# Patient Record
Sex: MALE | Race: OTHER | Employment: FULL TIME | URBAN - METROPOLITAN AREA
[De-identification: names, ages, dates, MRNs, and addresses within clinical notes are randomized per-mention and may not be internally consistent; named-entity substitution may affect disease eponyms.]

---

## 2017-07-17 ENCOUNTER — OFFICE VISIT (OUTPATIENT)
Dept: FAMILY MEDICINE CLINIC | Age: 57
End: 2017-07-17

## 2017-07-17 ENCOUNTER — HOSPITAL ENCOUNTER (OUTPATIENT)
Dept: LAB | Age: 57
Discharge: HOME | End: 2017-07-17
Payer: COMMERCIAL

## 2017-07-17 VITALS
SYSTOLIC BLOOD PRESSURE: 116 MMHG | HEART RATE: 71 BPM | TEMPERATURE: 98.1 F | OXYGEN SATURATION: 97 % | BODY MASS INDEX: 20.56 KG/M2 | WEIGHT: 131 LBS | RESPIRATION RATE: 16 BRPM | HEIGHT: 67 IN | DIASTOLIC BLOOD PRESSURE: 78 MMHG

## 2017-07-17 DIAGNOSIS — Z13.1 SCREENING FOR DIABETES MELLITUS (DM): ICD-10-CM

## 2017-07-17 DIAGNOSIS — Z13.220 SCREENING FOR LIPID DISORDERS: ICD-10-CM

## 2017-07-17 DIAGNOSIS — K14.9 TONGUE IRRITATION: Primary | ICD-10-CM

## 2017-07-17 DIAGNOSIS — K14.9 TONGUE IRRITATION: ICD-10-CM

## 2017-07-17 PROCEDURE — 87252 VIRUS INOCULATION TISSUE: CPT | Performed by: FAMILY MEDICINE

## 2017-07-17 RX ORDER — NYSTATIN 100000 [USP'U]/ML
1 SUSPENSION ORAL 4 TIMES DAILY
Qty: 200 ML | Refills: 0 | Status: SHIPPED | OUTPATIENT
Start: 2017-07-17 | End: 2017-07-27

## 2017-07-17 NOTE — PROGRESS NOTES
Chief Complaint   Patient presents with    Other     tongue cracking and burning- for approx 1.5 weeks (recent trip to [facility])     Has been using son's chlorhexadine with minor relief

## 2017-07-17 NOTE — PROGRESS NOTES
Danielle Mendez, 49 y.o.,  male    SUBJECTIVE  Tongue irritation x 1.5 weeks      Reports vacationing in [place], and noted cracking/dry lips then. Applied freq lip balm with some improvement, then developed tongue irritation, worse with flavorful food. Pt is a nonsmoker, says using chlohexidine mouth wash with some relief. Says food taste bland. ROS:  See HPI, all others negative        There is no problem list on file for this patient. Current Outpatient Prescriptions   Medication Sig Dispense Refill    nystatin (MYCOSTATIN) 100,000 unit/mL suspension Take 5 mL by mouth four (4) times daily for 10 days. swish and spit 200 mL 0       Allergies   Allergen Reactions    Amoxicillin Hives and Itching    Pcn [Penicillins] Hives and Itching       Past Medical History:   Diagnosis Date    H/O colonoscopy 18    rpt 10 years       Social History     Social History    Marital status:      Spouse name: N/A    Number of children: N/A    Years of education: N/A     Occupational History    Not on file.      Social History Main Topics    Smoking status: Never Smoker    Smokeless tobacco: Never Used    Alcohol use No    Drug use: No    Sexual activity: Yes     Partners: Female     Other Topics Concern    Not on file     Social History Narrative       Family History   Problem Relation Age of Onset    Hypertension Mother     Other Mother       from colitis complications     High Cholesterol Father     Diabetes Father     Heart Attack Paternal Grandmother     Heart Failure Paternal Grandmother          OBJECTIVE    Physical Exam:     Visit Vitals    /78 (BP 1 Location: Left arm, BP Patient Position: Sitting)    Pulse 71    Temp 98.1 °F (36.7 °C) (Oral)    Resp 16    Ht 5' 6.5\" (1.689 m)    Wt 131 lb (59.4 kg)    SpO2 97%    BMI 20.83 kg/m2       General: alert, well-appearing, in no apparent distress or pain  Eyes: Lids with no discharge, no matting, conjunctivae clear and non injected, full EOMs, PERLLA  Ears: pinna non-tender, external auditory canal patent, TM intact  Mouth/throat:tonsils non enlarged, pharynx non erythematous and no lesion, nasal mucosa normal  Thin whitish layer on anterior tongue, no ulcerations or lesions noted. Neck: supple, no adenopathy palpated  CVS: normal rate, regular rhythm, distinct S1 and S2  Lungs:clear to ausculation bilaterally, no crackles, wheezing or rhonchi noted  Psych:  mood and affect normal        ASSESSMENT/PLAN  Arlene Gracia was seen today for other. Diagnoses and all orders for this visit:    Tongue irritation  Mild  Trial:  -     nystatin (MYCOSTATIN) 100,000 unit/mL suspension; Take 5 mL by mouth four (4) times daily for 10 days. swish and spit  -     CULTURE, VIRAL; Future    Screening for lipid disorders  -     LIPID PANEL; Future    Screening for diabetes mellitus (DM)  -     METABOLIC PANEL, COMPREHENSIVE; Future      Follow-up Disposition:  Return in about 2 weeks (around 7/31/2017), or plan for CPE then. Patient understands plan of care. Patient has provided input and agrees with goals.

## 2017-07-17 NOTE — MR AVS SNAPSHOT
Visit Information Date & Time Provider Department Dept. Phone Encounter #  
 7/17/2017  3:00 PM Kit Wright, [address] [id number] Follow-up Instructions Return in about 2 weeks (around 7/31/2017), or plan for CPE then. Upcoming Health Maintenance Date Due DTaP/Tdap/Td series (2 - Td) 2/2/2016 INFLUENZA AGE 9 TO ADULT 8/1/2017 COLONOSCOPY 1/5/2019 Allergies as of 7/17/2017  Review Complete On: 7/17/2017 By: Kit Wright MD  
  
 Severity Noted Reaction Type Reactions Amoxicillin  12/30/2010    Hives, Itching Pcn [Penicillins]  09/29/2015    Hives, Itching Current Immunizations  Never Reviewed Name Date TDAP Vaccine 2/2/2006 Not reviewed this visit You Were Diagnosed With   
  
 Codes Comments Tongue irritation    -  Primary ICD-10-CM: K14.9 ICD-9-CM: 529.9 Screening for lipid disorders     ICD-10-CM: Z13.220 ICD-9-CM: V77.91 Screening for diabetes mellitus (DM)     ICD-10-CM: Z13.1 ICD-9-CM: V77.1 Vitals BP Pulse Temp Resp Height(growth percentile) Weight(growth percentile) 116/78 (BP 1 Location: Left arm, BP Patient Position: Sitting) 71 98.1 °F (36.7 °C) (Oral) 16 5' 6.5\" (1.689 m) 131 lb (59.4 kg) SpO2 BMI Smoking Status 97% 20.83 kg/m2 Never Smoker BMI and BSA Data Body Mass Index Body Surface Area  
 20.83 kg/m 2 1.67 m 2 Preferred Pharmacy Pharmacy Name Phone [facility] [place] [address] ,[facility] [address], [address] [facility], [address] [facility] [phone] Your Updated Medication List  
  
   
This list is accurate as of: 7/17/17  3:13 PM.  Always use your most recent med list.  
  
  
  
  
 nystatin 100,000 unit/mL suspension Commonly known as:  MYCOSTATIN Take 5 mL by mouth four (4) times daily for 10 days. swish and spit Prescriptions Sent to Pharmacy  Refills  
 nystatin (MYCOSTATIN) 100,000 unit/mL suspension 0  
 Sig: Take 5 mL by mouth four (4) times daily for 10 days. swish and spit Class: Normal  
 Pharmacy: [facility] [address] [place] [address], [address] [place] [address] #: [phone] Route: Oral  
  
Follow-up Instructions Return in about 2 weeks (around 7/31/2017), or plan for CPE then. To-Do List   
 07/17/2017 Lab:  LIPID PANEL   
  
 07/17/2017 Lab:  METABOLIC PANEL, COMPREHENSIVE Patient Instructions Please bring vaccine record on next visit, fasting labs prior to next visit as well. Introducing [facility] & HEALTH SERVICES! [facility] introduces [employer] patient portal. Now you can access parts of your medical record, email your doctor's office, and request medication refills online. 1. In your internet browser, go to [email or web address] 2. Click on the First Time User? Click Here link in the Sign In box. You will see the New Member Sign Up page. 3. Enter your [employer] Access Code exactly as it appears below. You will not need to use this code after youve completed the sign-up process. If you do not sign up before the expiration date, you must request a new code. · [employer] Access Code: [email or web address] Expires: 10/15/2017  3:13 PM 
 
4. Enter the last four digits of your Social Security Number (xxxx) and Date of Birth (mm/dd/yyyy) as indicated and click Submit. You will be taken to the next sign-up page. 5. Create a [employer] ID. This will be your [employer] login ID and cannot be changed, so think of one that is secure and easy to remember. 6. Create a [employer] password. You can change your password at any time. 7. Enter your Password Reset Question and Answer. This can be used at a later time if you forget your password. 8. Enter your e-mail address. You will receive e-mail notification when new information is available in [address] 9. Click Sign Up. You can now view and download portions of your medical record. 10. Click the Download Summary menu link to download a portable copy of your medical information. If you have questions, please visit the Frequently Asked Questions section of the Azingo website. Remember, [employer]zing[employer] is NOT to be used for urgent needs. For medical emergencies, dial 911. Now available from your iPhone and Android! Please provide this summary of care documentation to your next provider. Your primary care clinician is listed as Shmuel Rosario. If you have any questions after today's visit, please call [phone].

## 2017-07-21 ENCOUNTER — HOSPITAL ENCOUNTER (OUTPATIENT)
Dept: LAB | Age: 57
Discharge: HOME | End: 2017-07-21
Payer: COMMERCIAL

## 2017-07-21 DIAGNOSIS — Z13.220 SCREENING FOR LIPID DISORDERS: ICD-10-CM

## 2017-07-21 DIAGNOSIS — Z13.1 SCREENING FOR DIABETES MELLITUS (DM): ICD-10-CM

## 2017-07-21 LAB
ALBUMIN SERPL BCP-MCNC: 3.8 G/DL (ref 3.4–5)
ALBUMIN/GLOB SERPL: 1.5 {RATIO} (ref 0.8–1.7)
ALP SERPL-CCNC: 46 U/L (ref 45–117)
ALT SERPL-CCNC: 31 U/L (ref 16–61)
ANION GAP BLD CALC-SCNC: 6 MMOL/L (ref 3–18)
AST SERPL W P-5'-P-CCNC: 15 U/L (ref 15–37)
BILIRUB SERPL-MCNC: 0.5 MG/DL (ref 0.2–1)
BUN SERPL-MCNC: 14 MG/DL (ref 7–18)
BUN/CREAT SERPL: 15 (ref 12–20)
CALCIUM SERPL-MCNC: 8.3 MG/DL (ref 8.5–10.1)
CHLORIDE SERPL-SCNC: 105 MMOL/L (ref 100–108)
CHOLEST SERPL-MCNC: 186 MG/DL
CO2 SERPL-SCNC: 30 MMOL/L (ref 21–32)
CREAT SERPL-MCNC: 0.92 MG/DL (ref 0.6–1.3)
GLOBULIN SER CALC-MCNC: 2.6 G/DL (ref 2–4)
GLUCOSE SERPL-MCNC: 81 MG/DL (ref 74–99)
HDLC SERPL-MCNC: 47 MG/DL (ref 40–60)
HDLC SERPL: 4 {RATIO} (ref 0–5)
LDLC SERPL CALC-MCNC: 120.8 MG/DL (ref 0–100)
LIPID PROFILE,FLP: ABNORMAL
POTASSIUM SERPL-SCNC: 4.7 MMOL/L (ref 3.5–5.5)
PROT SERPL-MCNC: 6.4 G/DL (ref 6.4–8.2)
SODIUM SERPL-SCNC: 141 MMOL/L (ref 136–145)
TRIGL SERPL-MCNC: 91 MG/DL (ref ?–150)
VLDLC SERPL CALC-MCNC: 18.2 MG/DL

## 2017-07-21 PROCEDURE — 80061 LIPID PANEL: CPT | Performed by: FAMILY MEDICINE

## 2017-07-21 PROCEDURE — 36415 COLL VENOUS BLD VENIPUNCTURE: CPT | Performed by: FAMILY MEDICINE

## 2017-07-21 PROCEDURE — 80053 COMPREHEN METABOLIC PANEL: CPT | Performed by: FAMILY MEDICINE

## 2017-07-25 LAB
SPECIMEN SOURCE: NORMAL
VIRUS SPEC CULT: NORMAL

## 2017-07-26 NOTE — PROGRESS NOTES
Patient identified with 2 identifiers (name and ).   Patient aware of negative tongue culture reminded of appt 2017

## 2017-08-03 ENCOUNTER — OFFICE VISIT (OUTPATIENT)
Dept: FAMILY MEDICINE CLINIC | Age: 57
End: 2017-08-03

## 2017-08-03 VITALS
SYSTOLIC BLOOD PRESSURE: 114 MMHG | RESPIRATION RATE: 16 BRPM | HEART RATE: 74 BPM | WEIGHT: 130 LBS | DIASTOLIC BLOOD PRESSURE: 64 MMHG | HEIGHT: 67 IN | OXYGEN SATURATION: 98 % | TEMPERATURE: 97.9 F | BODY MASS INDEX: 20.4 KG/M2

## 2017-08-03 DIAGNOSIS — Z00.00 ROUTINE MEDICAL EXAM: ICD-10-CM

## 2017-08-03 DIAGNOSIS — Z00.00 WELL ADULT EXAM: Primary | ICD-10-CM

## 2017-08-03 NOTE — PROGRESS NOTES
Chief Complaint   Patient presents with    Well Male     1. Have you been to the ER, urgent care clinic since your last visit? Hospitalized since your last visit? No    2. Have you seen or consulted any other health care providers outside of the [address] [place] [address] since your last visit? Include any pap smears or colon screening.  No

## 2017-08-03 NOTE — MR AVS SNAPSHOT
Visit Information Date & Time Provider Department Dept. Phone Encounter #  
 8/3/2017  2:00 PM Esmeralda Ahumada, [address] [id number] Follow-up Instructions Return in about 1 year (around 8/3/2018), or if symptoms worsen or fail to improve. Upcoming Health Maintenance Date Due INFLUENZA AGE 9 TO ADULT 8/1/2017 COLONOSCOPY 1/5/2019 DTaP/Tdap/Td series (3 - Td) 8/3/2027 Allergies as of 8/3/2017  Review Complete On: 8/3/2017 By: Esmeralda Ahumada, MD  
  
 Severity Noted Reaction Type Reactions Amoxicillin  12/30/2010    Hives, Itching Pcn [Penicillins]  09/29/2015    Hives, Itching Current Immunizations  Never Reviewed Name Date TDAP Vaccine 2/2/2006 Not reviewed this visit You Were Diagnosed With   
  
 Codes Comments Well adult exam    -  Primary ICD-10-CM: Z00.00 ICD-9-CM: V70.0 Vitals BP Pulse Temp Resp Height(growth percentile) Weight(growth percentile) 114/64 (BP 1 Location: Left arm, BP Patient Position: Sitting) 74 97.9 °F (36.6 °C) (Oral) 16 5' 6.5\" (1.689 m) 130 lb (59 kg) SpO2 BMI Smoking Status 98% 20.67 kg/m2 Never Smoker Vitals History BMI and BSA Data Body Mass Index Body Surface Area  
 20.67 kg/m 2 1.66 m 2 Preferred Pharmacy Pharmacy Name Phone [facility], [address] [facility] [phone] Your Updated Medication List  
  
Notice  As of 8/3/2017  2:32 PM  
 You have not been prescribed any medications. Follow-up Instructions Return in about 1 year (around 8/3/2018), or if symptoms worsen or fail to improve. Patient Instructions Heart-Healthy Diet: Care Instructions Your Care Instructions A heart-healthy diet has lots of vegetables, fruits, nuts, beans, and whole grains, and is low in salt.  It limits foods that are high in saturated fat, such as meats, cheeses, and fried foods. It may be hard to change your diet, but even small changes can lower your risk of heart attack and heart disease. Follow-up care is a key part of your treatment and safety. Be sure to make and go to all appointments, and call your doctor if you are having problems. It's also a good idea to know your test results and keep a list of the medicines you take. How can you care for yourself at home? Watch your portions · Learn what a serving is. A \"serving\" and a \"portion\" are not always the same thing. Make sure that you are not eating larger portions than are recommended. For example, a serving of pasta is ½ cup. A serving size of meat is 2 to 3 ounces. A 3-ounce serving is about the size of a deck of cards. Measure serving sizes until you are good at [place]" them. Keep in mind that restaurants often serve portions that are 2 or 3 times the size of one serving. · To keep your energy level up and keep you from feeling hungry, eat often but in smaller portions. · Eat only the number of calories you need to stay at a healthy weight. If you need to lose weight, eat fewer calories than your body burns (through exercise and other physical activity). Eat more fruits and vegetables · Eat a variety of fruit and vegetables every day. Dark green, deep orange, red, or yellow fruits and vegetables are especially good for you. Examples include spinach, carrots, peaches, and berries. · Keep carrots, celery, and other veggies handy for snacks. Buy fruit that is in season and store it where you can see it so that you will be tempted to eat it. · Cook dishes that have a lot of veggies in them, such as stir-fries and soups. Limit saturated and trans fat · Read food labels, and try to avoid saturated and trans fats. They increase your risk of heart disease. Trans fat is found in many processed foods such as cookies and crackers. · Use olive or canola oil when you cook. Try cholesterol-lowering spreads, such as Benecol or Take Control. · Bake, broil, grill, or steam foods instead of frying them. · Choose lean meats instead of high-fat meats such as hot dogs and sausages. Cut off all visible fat when you prepare meat. · Eat fish, skinless poultry, and meat alternatives such as soy products instead of high-fat meats. Soy products, such as tofu, may be especially good for your heart. · Choose low-fat or fat-free milk and dairy products. Eat fish · Eat at least two servings of fish a week. Certain fish, such as salmon and tuna, contain omega-3 fatty acids, which may help reduce your risk of heart attack. Eat foods high in fiber · Eat a variety of grain products every day. Include whole-grain foods that have lots of fiber and nutrients. Examples of whole-grain foods include oats, whole wheat bread, and brown rice. · Buy whole-grain breads and cereals, instead of white bread or pastries. Limit salt and sodium · Limit how much salt and sodium you eat to help lower your blood pressure. · Taste food before you salt it. Add only a little salt when you think you need it. With time, your taste buds will adjust to less salt. · Eat fewer snack items, fast foods, and other high-salt, processed foods. Check food labels for the amount of sodium in packaged foods. · Choose low-sodium versions of canned goods (such as soups, vegetables, and beans). Limit sugar · Limit drinks and foods with added sugar. These include candy, desserts, and soda pop. Limit alcohol · Limit alcohol to no more than 2 drinks a day for men and 1 drink a day for women. Too much alcohol can cause health problems. When should you call for help? Watch closely for changes in your health, and be sure to contact your doctor if: 
· You would like help planning heart-healthy meals. Where can you learn more? Go to http://jimmy.info/. Enter V137 in the search box to learn more about \"Heart-Healthy Diet: Care Instructions. \" Current as of: April 3, 2017 Content Version: 11.3 © 5151-0195 [employer], [employer]. Care instructions adapted under license by [employer] (which disclaims liability or warranty for this information). If you have questions about a medical condition or this instruction, always ask your healthcare professional. [address] any warranty or liability for your use of this information. Introducing \[facility]\"" & HEALTH SERVICES! Mervat Rae introduces [employer] patient portal. Now you can access parts of your medical record, email your doctor's office, and request medication refills online. 1. In your internet browser, go to [email or web address] 2. Click on the First Time User? Click Here link in the Sign In box. You will see the New Member Sign Up page. 3. Enter your [employer] Access Code exactly as it appears below. You will not need to use this code after youve completed the sign-up process. If you do not sign up before the expiration date, you must request a new code. · [employer] Access Code: [email or web address] Expires: 10/15/2017  3:13 PM 
 
4. Enter the last four digits of your Social Security Number (xxxx) and Date of Birth (mm/dd/yyyy) as indicated and click Submit. You will be taken to the next sign-up page. 5. Create a [employer] ID. This will be your [employer] login ID and cannot be changed, so think of one that is secure and easy to remember. 6. Create a [employer] password. You can change your password at any time. 7. Enter your Password Reset Question and Answer. This can be used at a later time if you forget your password. 8. Enter your e-mail address. You will receive e-mail notification when new information is available in [address] 9. Click Sign Up. You can now view and download portions of your medical record. 10. Click the Download Summary menu link to download a portable copy of your medical information. If you have questions, please visit the Frequently Asked Questions section of the Boond website. Remember, [employer] is NOT to be used for urgent needs. For medical emergencies, dial 911. Now available from your iPhone and Android! Please provide this summary of care documentation to your next provider. Your primary care clinician is listed as Margarette Young. If you have any questions after today's visit, please call [phone].

## 2017-08-03 NOTE — PATIENT INSTRUCTIONS

## 2017-08-03 NOTE — PROGRESS NOTES
Subjective:   Ap Pozo is a 49 y.o. male presenting for his annual checkup. ROS:  Feeling well. No dyspnea or chest pain on exertion. No abdominal pain, change in bowel habits, black or bloody stools. No urinary tract or prostatic symptoms. No neurological complaints. Specific concerns today: slight improvement of tongue discomfort with trial nystatin, thinks the chlorhexidine mouthwash more effective. Reviewed labs/colonoscopy report    Allergies   Allergen Reactions    Amoxicillin Hives and Itching    Pcn [Penicillins] Hives and Itching     Past Medical History:   Diagnosis Date    H/O colonoscopy 2016    rpt 5 years,      Past Surgical History:   Procedure Laterality Date    APPENDECTOMY      HX COLONOSCOPY  2016    Dr. Rizwan Lew 1 polyp    HX RHINOPLASTY      Dr. Amira Domínguez History   Problem Relation Age of Onset    Hypertension Mother     Other Mother       from colitis complications     High Cholesterol Father     Diabetes Father     Heart Attack Paternal Grandmother     Heart Failure Paternal Grandmother      Social History   Substance Use Topics    Smoking status: Never Smoker    Smokeless tobacco: Never Used    Alcohol use No          Objective:     Visit Vitals    /64 (BP 1 Location: Left arm, BP Patient Position: Sitting)    Pulse 74    Temp 97.9 °F (36.6 °C) (Oral)    Resp 16    Ht 5' 6.5\" (1.689 m)    Wt 130 lb (59 kg)    SpO2 98%    BMI 20.67 kg/m2     The patient appears well, alert, oriented x 3, in no distress. ENT normal.  Neck supple. No adenopathy or thyromegaly. BRYCE. Lungs are clear, good air entry, no wheezes, rhonchi or rales. S1 and S2 normal, no murmurs, regular rate and rhythm. Abdomen is soft without tenderness, guarding, mass or organomegaly. Extremities show no edema, normal peripheral pulses. Neurological is normal without focal findings.   Results for orders placed or performed during the hospital encounter of 07/21/17   LIPID PANEL   Result Value Ref Range    LIPID PROFILE          Cholesterol, total 186 <200 MG/DL    Triglyceride 91 <150 MG/DL    HDL Cholesterol 47 40 - 60 MG/DL    LDL, calculated 120.8 (H) 0 - 100 MG/DL    VLDL, calculated 18.2 MG/DL    CHOL/HDL Ratio 4.0 0 - 5.0     METABOLIC PANEL, COMPREHENSIVE   Result Value Ref Range    Sodium 141 136 - 145 mmol/L    Potassium 4.7 3.5 - 5.5 mmol/L    Chloride 105 100 - 108 mmol/L    CO2 30 21 - 32 mmol/L    Anion gap 6 3.0 - 18 mmol/L    Glucose 81 74 - 99 mg/dL    BUN 14 7.0 - 18 MG/DL    Creatinine 0.92 0.6 - 1.3 MG/DL    BUN/Creatinine ratio 15 12 - 20      GFR est AA >60 >60 ml/min/1.73m2    GFR est non-AA >60 >60 ml/min/1.73m2    Calcium 8.3 (L) 8.5 - 10.1 MG/DL    Bilirubin, total 0.5 0.2 - 1.0 MG/DL    ALT (SGPT) 31 16 - 61 U/L    AST (SGOT) 15 15 - 37 U/L    Alk. phosphatase 46 45 - 117 U/L    Protein, total 6.4 6.4 - 8.2 g/dL    Albumin 3.8 3.4 - 5.0 g/dL    Globulin 2.6 2.0 - 4.0 g/dL    A-G Ratio 1.5 0.8 - 1.7         Assessment/Plan:   Diagnoses and all orders for this visit:    1. Well adult exam  healthy adult male  continue present plan, call if any problems. reviewed diet, exercise and weight control. Colonoscopy update 2021  Declines tdap, says thinks he is UTD, will check records  Advised to call us if tongue discomfort persist by next month and will refer to ENT    Ff-up in 1 year or sooner prn. Patient/guardian understands plan of care. Patient has provided input and agrees with goals. Future labs to be discussed on next visit.

## 2017-08-25 ENCOUNTER — CLINICAL SUPPORT (OUTPATIENT)
Dept: FAMILY MEDICINE CLINIC | Age: 57
End: 2017-08-25

## 2017-08-25 DIAGNOSIS — Z23 NEED FOR TDAP VACCINATION: Primary | ICD-10-CM

## 2017-08-25 NOTE — PROGRESS NOTES
Chief Complaint   Patient presents with    Immunization/Injection     Tdap     Patient presents for the following vaccines: Tdap. Patient consent obtained by patient. Patient tolerated procedure well at right deltoid.  VIS given to patient    Lot# [id number]   Exp: 10/14/2019  MFG:[id number]  NDC: 90028-569-35

## 2019-10-30 ENCOUNTER — HOSPITAL ENCOUNTER (OUTPATIENT)
Dept: LAB | Age: 59
Discharge: HOME | End: 2019-10-30
Payer: COMMERCIAL

## 2019-10-30 ENCOUNTER — OFFICE VISIT (OUTPATIENT)
Dept: FAMILY MEDICINE CLINIC | Age: 59
End: 2019-10-30

## 2019-10-30 VITALS
SYSTOLIC BLOOD PRESSURE: 116 MMHG | BODY MASS INDEX: 20.88 KG/M2 | WEIGHT: 133 LBS | OXYGEN SATURATION: 98 % | HEART RATE: 61 BPM | RESPIRATION RATE: 16 BRPM | DIASTOLIC BLOOD PRESSURE: 70 MMHG | TEMPERATURE: 97.5 F | HEIGHT: 67 IN

## 2019-10-30 DIAGNOSIS — R13.10 DYSPHAGIA, UNSPECIFIED TYPE: Primary | ICD-10-CM

## 2019-10-30 DIAGNOSIS — K21.9 GASTROESOPHAGEAL REFLUX DISEASE, ESOPHAGITIS PRESENCE NOT SPECIFIED: ICD-10-CM

## 2019-10-30 DIAGNOSIS — R14.0 BLOATING: ICD-10-CM

## 2019-10-30 DIAGNOSIS — Z12.5 PROSTATE CANCER SCREENING: ICD-10-CM

## 2019-10-30 DIAGNOSIS — R10.13 EPIGASTRIC PAIN: ICD-10-CM

## 2019-10-30 DIAGNOSIS — Z13.220 LIPID SCREENING: ICD-10-CM

## 2019-10-30 LAB
ALBUMIN SERPL-MCNC: 3.9 G/DL (ref 3.4–5)
ALBUMIN/GLOB SERPL: 1.4 {RATIO} (ref 0.8–1.7)
ALP SERPL-CCNC: 45 U/L (ref 45–117)
ALT SERPL-CCNC: 24 U/L (ref 16–61)
ANION GAP SERPL CALC-SCNC: 2 MMOL/L (ref 3–18)
AST SERPL-CCNC: 13 U/L (ref 10–38)
BASOPHILS # BLD: 0 K/UL (ref 0–0.1)
BASOPHILS NFR BLD: 1 % (ref 0–2)
BILIRUB SERPL-MCNC: 0.5 MG/DL (ref 0.2–1)
BUN SERPL-MCNC: 15 MG/DL (ref 7–18)
BUN/CREAT SERPL: 16 (ref 12–20)
CALCIUM SERPL-MCNC: 9.1 MG/DL (ref 8.5–10.1)
CHLORIDE SERPL-SCNC: 110 MMOL/L (ref 100–111)
CO2 SERPL-SCNC: 32 MMOL/L (ref 21–32)
CREAT SERPL-MCNC: 0.96 MG/DL (ref 0.6–1.3)
DIFFERENTIAL METHOD BLD: NORMAL
EOSINOPHIL # BLD: 0.1 K/UL (ref 0–0.4)
EOSINOPHIL NFR BLD: 2 % (ref 0–5)
ERYTHROCYTE [DISTWIDTH] IN BLOOD BY AUTOMATED COUNT: 13.3 % (ref 11.6–14.5)
GLOBULIN SER CALC-MCNC: 2.8 G/DL (ref 2–4)
GLUCOSE SERPL-MCNC: 87 MG/DL (ref 74–99)
HCT VFR BLD AUTO: 45.5 % (ref 36–48)
HGB BLD-MCNC: 15 G/DL (ref 13–16)
LIPASE SERPL-CCNC: 90 U/L (ref 73–393)
LYMPHOCYTES # BLD: 1.2 K/UL (ref 0.9–3.6)
LYMPHOCYTES NFR BLD: 25 % (ref 21–52)
MCH RBC QN AUTO: 28.9 PG (ref 24–34)
MCHC RBC AUTO-ENTMCNC: 33 G/DL (ref 31–37)
MCV RBC AUTO: 87.7 FL (ref 74–97)
MONOCYTES # BLD: 0.4 K/UL (ref 0.05–1.2)
MONOCYTES NFR BLD: 9 % (ref 3–10)
NEUTS SEG # BLD: 3 K/UL (ref 1.8–8)
NEUTS SEG NFR BLD: 63 % (ref 40–73)
PLATELET # BLD AUTO: 197 K/UL (ref 135–420)
PMV BLD AUTO: 10.5 FL (ref 9.2–11.8)
POTASSIUM SERPL-SCNC: 4.6 MMOL/L (ref 3.5–5.5)
PROT SERPL-MCNC: 6.7 G/DL (ref 6.4–8.2)
RBC # BLD AUTO: 5.19 M/UL (ref 4.7–5.5)
SODIUM SERPL-SCNC: 144 MMOL/L (ref 136–145)
WBC # BLD AUTO: 4.7 K/UL (ref 4.6–13.2)

## 2019-10-30 PROCEDURE — 80053 COMPREHEN METABOLIC PANEL: CPT

## 2019-10-30 PROCEDURE — 83690 ASSAY OF LIPASE: CPT

## 2019-10-30 PROCEDURE — 85025 COMPLETE CBC W/AUTO DIFF WBC: CPT

## 2019-10-30 PROCEDURE — 36415 COLL VENOUS BLD VENIPUNCTURE: CPT

## 2019-10-30 NOTE — PROGRESS NOTES
Estrellita Adams, 64 y.o.,  male    SUBJECTIVE  Dysphagia since June     Pt reports sensation of food/drinks getting stuck intermittently. At times regurgitation of both solid and liquids, has pattern with dairy, says after switching to almond milk and physically pressing on his epigastric area, he finds relief. He has heartburn and bloating symptoms as well. Denies weight loss, melena. Reviewed colonoscopy letter [facility] 1/27/16 benign polyp repeat 10 years. He denies any changes in BMs. He has not tried anything for this. He is otherwise healthy he says, stays active, he runs. ROS:  See HPI, all others negative        There is no problem list on file for this patient.           Allergies   Allergen Reactions    Amoxicillin Hives and Itching    Pcn [Penicillins] Hives and Itching       Past Medical History:   Diagnosis Date    H/O colonoscopy 01/05/2016    rpt 5 years, 2021       Social History     Socioeconomic History    Marital status:      Spouse name: Not on file    Number of children: Not on file    Years of education: Not on file    Highest education level: Not on file   Occupational History    Not on file   Social Needs    Financial resource strain: Not on file    Food insecurity:     Worry: Not on file     Inability: Not on file    Transportation needs:     Medical: Not on file     Non-medical: Not on file   Tobacco Use    Smoking status: Never Smoker    Smokeless tobacco: Never Used   Substance and Sexual Activity    Alcohol use: No    Drug use: No    Sexual activity: Yes     Partners: Female   Lifestyle    Physical activity:     Days per week: Not on file     Minutes per session: Not on file    Stress: Not on file   Relationships    Social connections:     Talks on phone: Not on file     Gets together: Not on file     Attends Yarsanism service: Not on file     Active member of club or organization: Not on file     Attends meetings of clubs or organizations: Not on file Relationship status: Not on file    Intimate partner violence:     Fear of current or ex partner: Not on file     Emotionally abused: Not on file     Physically abused: Not on file     Forced sexual activity: Not on file   Other Topics Concern    Not on file   Social History Narrative    Not on file       Family History   Problem Relation Age of Onset    Hypertension Mother     Other Mother          from colitis complications     High Cholesterol Father     Diabetes Father     Heart Attack Paternal Grandmother     Heart Failure Paternal Grandmother          OBJECTIVE    Physical Exam:     Visit Vitals  /70 (BP 1 Location: Left arm, BP Patient Position: Sitting)   Pulse 61   Temp 97.5 °F (36.4 °C) (Oral)   Resp 16   Ht 5' 6.5\" (1.689 m)   Wt 133 lb (60.3 kg)   SpO2 98%   BMI 21.15 kg/m²       General: alert, well-appearing,  in no apparent distress or pain  Head: atraumatic. Non-tender maxillary and frontal sinuses  Eyes: Lids with no discharge, no matting, conjunctivae clear and non injected, full EOMs, PERLLA  Ears: pinna non-tender, external auditory canal patent, TM intact  Mouth/throat:tonsils non enlarged, pharynx non erythematous and no lesion, nasal mucosa normal  Neck: supple, no adenopathy palpated  CVS: normal rate, regular rhythm, distinct S1 and S2  Lungs:clear to ausculation bilaterally, no crackles, wheezing or rhonchi noted  Abdomen: normoactive bowel sounds, soft, non-tender, negative murphys  Extremities: no edema, no cyanosis, MSK grossly normal  Skin: warm, no lesions, rashes noted  Psych:  mood and affect normal        ASSESSMENT/PLAN  Diagnoses and all orders for this visit:    1. Dysphagia, unspecified type  Subacute, no red flag signs  Start PPI   Offered barium swallow, prefers to have GI eval/EGD done  Refer to GI  -     CBC WITH AUTOMATED DIFF; Future  -     METABOLIC PANEL, COMPREHENSIVE; Future  -     LIPASE; Future  -     REFERRAL TO GASTROENTEROLOGY    2. Gastroesophageal reflux disease, esophagitis presence not specified  Start protonix 40 mg 30 mins prior to meals  -     REFERRAL TO GASTROENTEROLOGY    3. Epigastric pain  -     REFERRAL TO GASTROENTEROLOGY    4. Prostate cancer screening  Shared decision  -     PSA SCREENING (SCREENING); Future    5. Lipid screening  -     LIPID PANEL; Future    6. Bloating    Follow-up and Dispositions    · Return in about 6 weeks (around 12/11/2019), or if symptoms worsen or fail to improve, for after your GI evaluation , plan for Complete physical on next visit. Patient understands plan of care. Patient has provided input and agrees with goals.

## 2019-10-30 NOTE — PATIENT INSTRUCTIONS

## 2019-10-30 NOTE — PROGRESS NOTES
1. Have you been to the ER, urgent care clinic since your last visit? Hospitalized since your last visit? No    2. Have you seen or consulted any other health care providers outside of the [address] [place] [address] since your last visit? Include any pap smears or colon screening.  No

## 2019-11-04 NOTE — PROGRESS NOTES
Patient identified with 2 identifiers (name and ). Patients spouse aware of normal labs.    Inocencio Urias (on HIPPA)

## 2020-07-14 ENCOUNTER — OFFICE VISIT (OUTPATIENT)
Dept: ORTHOPEDIC SURGERY | Age: 60
End: 2020-07-14

## 2020-07-14 VITALS
HEIGHT: 67 IN | DIASTOLIC BLOOD PRESSURE: 88 MMHG | WEIGHT: 141.6 LBS | HEART RATE: 85 BPM | TEMPERATURE: 97.7 F | BODY MASS INDEX: 22.22 KG/M2 | RESPIRATION RATE: 16 BRPM | SYSTOLIC BLOOD PRESSURE: 115 MMHG | OXYGEN SATURATION: 91 %

## 2020-07-14 DIAGNOSIS — M25.512 ACUTE PAIN OF LEFT SHOULDER: ICD-10-CM

## 2020-07-14 DIAGNOSIS — M75.02 ADHESIVE CAPSULITIS OF LEFT SHOULDER: Primary | ICD-10-CM

## 2020-07-14 RX ORDER — MELOXICAM 15 MG/1
15 TABLET ORAL
Qty: 30 TAB | Refills: 1 | Status: SHIPPED | OUTPATIENT
Start: 2020-07-14 | End: 2020-10-09

## 2020-07-14 NOTE — PROGRESS NOTES
Verbal order with read back given by Dr. Katie Morales to sign order for PT entered by Catie Moran ([address]).

## 2020-07-14 NOTE — PROGRESS NOTES
José Luis Cabral  1960   Chief Complaint   Patient presents with    Shoulder Pain     left shoulder pain        HISTORY OF PRESENT ILLNESS  José Luis Cabral is a 64 y.o. male who presents today for evaluation of left shoulder. He rates his pain 3/10 today. Pain has been present since February/March. Pt denies any specific injury. 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 Pain with stretching, reaching, certain movements of the arm. Pain at night with sleeping on the left side. Patient describes the pain as numbing and stabbing that is Intermittent in nature. Symptoms are worse with bending, stretching, straightening, Activity and is better with  nothing. Associated symptoms include stiffness. Since problem started, it: is unchanged. Pain does not wake patient up at night. Has taken no meds for the problem. Has tried following treatments: Injections:NO; Brace:NO;  Therapy:NO; Cane/Crutch:NO       Allergies   Allergen Reactions    Amoxicillin Hives and Itching    Pcn [Penicillins] Hives and Itching        Past Medical History:   Diagnosis Date    H/O colonoscopy 01/05/2016    rpt 5 years, 2021      Social History     Socioeconomic History    Marital status:      Spouse name: Not on file    Number of children: Not on file    Years of education: Not on file    Highest education level: Not on file   Occupational History    Not on file   Social Needs    Financial resource strain: Not on file    Food insecurity     Worry: Not on file     Inability: Not on file   [employer] needs     Medical: Not on file     Non-medical: Not on file   Tobacco Use    Smoking status: Never Smoker    Smokeless tobacco: Never Used   Substance and Sexual Activity    Alcohol use: No    Drug use: No    Sexual activity: Yes     Partners: Female   Lifestyle    Physical activity     Days per week: Not on file     Minutes per session: Not on file    Stress: Not on file   Relationships    Social connections     Talks on phone: Not on file     Gets together: Not on file     Attends [employer] service: Not on file     Active member of club or organization: Not on file     Attends meetings of clubs or organizations: Not on file     Relationship status: Not on file    Intimate partner violence     Fear of current or ex partner: Not on file     Emotionally abused: Not on file     Physically abused: Not on file     Forced sexual activity: Not on file   Other Topics Concern    Not on file   Social History Narrative    Not on file      Past Surgical History:   Procedure Laterality Date    APPENDECTOMY      HX COLONOSCOPY  2016    Dr. Grisel Whittaker 1 polyp    HX RHINOPLASTY      Dr. Yaquelin Cuevas Age of Onset    Hypertension Mother     Other Mother          from colitis complications     High Cholesterol Father     Diabetes Father     Heart Attack Paternal Grandmother     Heart Failure Paternal Grandmother       Current Outpatient Medications   Medication Sig    omeprazole magnesium (PRILOSEC PO) Take  by mouth. No current facility-administered medications for this visit. REVIEW OF SYSTEM   Patient denies: Weight loss, Fever/Chills, HA, Visual changes, Fatigue, Chest pain, SOB, Abdominal pain, N/V/D/C, Blood in stool or urine, Edema. Pertinent positive as above in HPI. All others were negative    PHYSICAL EXAM:   Visit Vitals  /88 (BP 1 Location: Left arm, BP Patient Position: Sitting)   Pulse 85   Temp 97.7 °F (36.5 °C) (Oral)   Resp 16   Ht 5' 6.5\" (1.689 m)   Wt 141 lb 9.6 oz (64.2 kg)   SpO2 91%   BMI 22.51 kg/m²     The patient is a well-developed, well-nourished male   in no acute distress. The patient is alert and oriented times three. The patient is alert and oriented times three. Mood and affect are normal.  LYMPHATIC: lymph nodes are not enlarged and are within normal limits  SKIN: normal in color and non tender to palpation.  There are no bruises or abrasions noted. NEUROLOGICAL: Motor sensory exam is within normal limits. Reflexes are equal bilaterally. There is normal sensation to pinprick and light touch  MUSCULOSKELETAL:  Examination Left shoulder   Skin Intact   AC joint tenderness -   Biceps tenderness -   Forward flexion/Elevation    Active abduction    Glenohumeral abduction 80   External rotation ROM 45   Internal rotation ROM 30   Apprehension -   Marinas Relocation -   Jerk -   Load and Shift -   Obriens -   Speeds -   Impingement sign  +   Supraspinatus/Empty Can -, 5/5   External Rotation Strength -, 5/5   Lift Off/Belly Press -, 5/5   Neurovascular Intact       IMAGING: XR of left shoulder obtained in the office dated 7/14/2020 was reviewed and read by Dr. Ad Rosa: No acute abnormalities       IMPRESSION:      ICD-10-CM ICD-9-CM    1. Adhesive capsulitis of left shoulder  M75.02 726.0 REFERRAL TO PHYSICAL THERAPY      meloxicam (Mobic) 15 mg tablet   2. Acute pain of left shoulder  M25.512 719.41 AMB POC XRAY, SHOULDER; COMPLETE, 2+        PLAN:  1. Pt presents today with left shoulder pain due to adhesive capsulitis and I would like for him to begin PT. Was also given a prescription for Mobic today. Risk factors include: n/a  2. No ultrasound exam indicated today  3. No cortisone injection indicated today   4. Yes Physical/Occupational Therapy indicated today  5. No diagnostic test indicated today:   6. No durable medical equipment indicated today  7. No referral indicated today   8. Yes medications indicated today: MOBIC  9. No Narcotic indicated today       RTC 4 weeks      Scribed by Tate Avilez [address] [place] [address] [address]) as dictated by Wong oMrales MD    I, Dr. Wong Morales, confirm that all documentation is accurate.     Wong Morales M.D.   [facility] and Spine Specialist

## 2020-07-21 ENCOUNTER — HOSPITAL ENCOUNTER (OUTPATIENT)
Dept: PHYSICAL THERAPY | Age: 60
Discharge: HOME | End: 2020-07-21
Payer: COMMERCIAL

## 2020-07-21 PROCEDURE — 97110 THERAPEUTIC EXERCISES: CPT

## 2020-07-21 PROCEDURE — 97161 PT EVAL LOW COMPLEX 20 MIN: CPT

## 2020-07-21 NOTE — PROGRESS NOTES
[facility] [address], [address] [phone] fax    Plan of Care/ Statement of Necessity for Physical Therapy Services           Patient name: Mallory Pierre Start of Care: 2020   Referral source: Rashmi Shoemaker,* : 1960    Medical Diagnosis: Pain in left shoulder [M25.512]  Payor: VIV PATEL / Plan: [facility]eron[facility] Cano / Product Type: PPO /  Onset Date:2020    Treatment Diagnosis: left shoulder pain   Prior Hospitalization: see medical history Provider#: [id number]   Medications: Verified on Patient summary List    Comorbidities: none per patient   Prior Level of Function: functionally independent and active. Works as manager at  [employer] and following information is based on the information from the initial evaluation. Assessment/ key information: Patient is a 64 y. o.male presenting with Pain in left shoulder [M25.512]. Mr. Vernon Chao presents to initial PT evaluation with c/o left shoulder pain and stiffness worsening since 2020 after swatting at a bug. He displays limited passive ROM in a capsular pattern, as well as limited active ROM overhead and behind the back. (+) RTC involvement and impingement signs present. Symptoms consistent with impingement with beginnings of adhesive capsulitis. Patient hs requested to trial HEP from today due to busy work schedule. Discussed he will trial at home and should he have nay limitations over the next month we will see him back in the clinic for revision or progression of current HEP. Patient in agreement with this plan. . Patient will benefit from skilled PT services to address deficits and facilitate return to premorbid activity level and promote improved quality of life.        Evaluation Complexity History LOW Complexity : Zero comorbidities / personal factors that will impact the outcome / POC; Examination LOW Complexity : 1-2 Standardized tests and measures addressing body structure, function, activity limitation and / or participation in recreation  ;Presentation MEDIUM Complexity : Evolving with changing characteristics  ; Clinical Decision Making MEDIUM Complexity : FOTO score of 26-74  Overall Complexity Rating: LOW   Problem List: pain affecting function, decrease ROM, decrease strength, edema affecting function, impaired gait/ balance, decrease ADL/ functional abilitiies, decrease activity tolerance, decrease flexibility/ joint mobility and decrease transfer abilities   Treatment Plan may include any combination of the following: Therapeutic exercise, Therapeutic activities, Neuromuscular re-education, Physical agent/modality, Gait/balance training, Manual therapy, Aquatic therapy, Patient education, Self Care training, Functional mobility training, Home safety training and Stair training  Patient / Family readiness to learn indicated by: asking questions, trying to perform skills and interest  Persons(s) to be included in education: patient (P)  Barriers to Learning/Limitations: None  Patient Goal (s): make it go away.   Patient Self Reported Health Status: good  Rehabilitation Potential: good  Goals: To be accomplished in 2 treatments. 1. Therapist to establish HEP for strength and ROM to improve ease with ADLs. MET - issued at evaluation  2.. Patient will be independent with HEP to improve carryover of functional gains with ADLs between visits. showed compliance with HEP provided at time of exam.     Frequency / Duration: Patient to be seen 1 times per week for 2 treatments.     Patient/ Caregiver education and instruction: Diagnosis, prognosis, self care, activity modification and exercises   [x]  Plan of care has been reviewed with ANISH De La Torre, PT 7/21/2020 1:35 PM    ________________________________________________________________________    I certify that the above Therapy Services are being furnished while the patient is under my care. I agree with the treatment plan and certify that this therapy is necessary.     Physician's Signature:____________Date:_________TIME:________    ** Signature, Date and Time must be completed for valid certification **    Please sign and return to [facility] [address], [address] [phone] fax

## 2020-07-21 NOTE — PROGRESS NOTES
PT DAILY TREATMENT NOTE - [facility]     Patient Name: Marita Engel  Date:2020  : 1960  [x]  Patient  Verified  Payor: BLUE CROSS / Plan:  [facility] / Product Type: PPO /    In time:200  Out time:245  Total Treatment Time (min): 45  Total Timed Codes (min): 30  1:1 Treatment Time: 45   Visit #: 1 of 2    Treatment Area: Pain in left shoulder [M25.512]    SUBJECTIVE  Pain Level (0-10 scale): 2  Any medication changes, allergies to medications, adverse drug reactions, diagnosis change, or new procedure performed?: [x] No    [] Yes (see summary sheet for update)  Subjective functional status:   [x] See Eval form in paper chart      OBJECTIVE    15 min [x]Eval                  []Re-Eval       30 min Therapeutic Exercise:  [x] See flow sheet :HEP review and education   Rationale: increase ROM, increase strength, improve coordination, improve balance and increase proprioception to improve the patients ability to perform ADLs. With   [] TE   [] TA   [] neuro   [] other: Patient Education: [x] Review HEP    [] Progressed/Changed HEP based on:   [] positioning   [] body mechanics   [] transfers   [] heat/ice application    [] other:                  Pain Level (0-10 scale) post treatment: 2    ASSESSMENT:   [x]  See Evaluation         Goals:  Goals: To be accomplished in 2 treatments. 1. Therapist to establish HEP for strength and ROM to improve ease with ADLs. MET - issued at evaluation  2.. Patient will be independent with HEP to improve carryover of functional gains with ADLs between visits.     showed compliance with HEP provided at time of exam.    PLAN      [x]  Continue plan of care    []  Other:_      Renato Pugh, PT 2020  1:36 PM

## 2020-08-11 ENCOUNTER — OFFICE VISIT (OUTPATIENT)
Dept: ORTHOPEDIC SURGERY | Age: 60
End: 2020-08-11

## 2020-08-11 VITALS
OXYGEN SATURATION: 98 % | HEIGHT: 67 IN | BODY MASS INDEX: 22.13 KG/M2 | DIASTOLIC BLOOD PRESSURE: 80 MMHG | HEART RATE: 93 BPM | RESPIRATION RATE: 17 BRPM | SYSTOLIC BLOOD PRESSURE: 131 MMHG | TEMPERATURE: 98.6 F | WEIGHT: 141 LBS

## 2020-08-11 DIAGNOSIS — M75.02 ADHESIVE CAPSULITIS OF LEFT SHOULDER: Primary | ICD-10-CM

## 2020-08-11 NOTE — PROGRESS NOTES
Isaiah Theodore  1960   Chief Complaint   Patient presents with    Shoulder Pain     left        HISTORY OF PRESENT ILLNESS  Isaiah Theodore is a 64 y.o. male who presents today for reevaluation of left shoulder. Patient rates pain as 0/10 today. Has been going to PT with improvement. He has improved ROM, only notes occasional pain and tightness. He states he had 80% improvement after the first session of PT. Night pain has improved. Patient denies any fever, chills, chest pain, shortness of breath or calf pain. The remainder of the review of systems is negative. There are no new illness or injuries to report since last seen in the office. There are no changes to medications, allergies, family or social history. PHYSICAL EXAM:   Visit Vitals  /80 (BP 1 Location: Left arm)   Pulse 93   Temp 98.6 °F (37 °C) (Temporal)   Resp 17   Ht 5' 6.5\" (1.689 m)   Wt 141 lb (64 kg)   SpO2 98%   BMI 22.42 kg/m²     The patient is a well-developed, well-nourished male   in no acute distress. The patient is alert and oriented times three. The patient is alert and oriented times three. Mood and affect are normal.  LYMPHATIC: lymph nodes are not enlarged and are within normal limits  SKIN: normal in color and non tender to palpation. There are no bruises or abrasions noted. NEUROLOGICAL: Motor sensory exam is within normal limits. Reflexes are equal bilaterally.  There is normal sensation to pinprick and light touch  MUSCULOSKELETAL:  Examination Left shoulder   Skin Intact   AC joint tenderness -   Biceps tenderness -   Forward flexion/Elevation    Active abduction    Glenohumeral abduction 80   External rotation ROM 45   Internal rotation ROM 30   Apprehension -   Marinas Relocation -   Jerk -   Load and Shift -   Obriens -   Speeds -   Impingement sign  +   Supraspinatus/Empty Can -, 5/5   External Rotation Strength -, 5/5   Lift Off/Belly Press -, 5/5   Neurovascular Intact      IMAGING:  XR of left shoulder obtained in the office dated 7/14/2020 was reviewed and read by Dr. Cain Rain: No acute abnormalities       IMPRESSION:      ICD-10-CM ICD-9-CM    1. Adhesive capsulitis of left shoulder  M75.02 726.0         PLAN:   1. Pt presents today with left shoulder pain due to adhesive capsulitis that has improved with PT. He can continue with his stretching exercises and can return as needed. Risk factors include: n/a  2. No ultrasound exam indicated today  3. No cortisone injection indicated today   4. No Physical/Occupational Therapy indicated today  5. No diagnostic test indicated today:   6. No durable medical equipment indicated today  7. No referral indicated today   8. No medications indicated today:   9. No Narcotic indicated today       RTC prn      Scribed by Catie Valdez) as dictated by Heather Barrera MD    I, Dr. Heather Barrera, confirm that all documentation is accurate.     Heather Barrera M.D.   [address] [place] [address] and Spine Specialist

## 2020-09-21 NOTE — PROGRESS NOTES
[facility] [place] [address]  [address], [address] [phone] fax    Discharge Summary  Patient name: Meme Saavedra Start of Care: 2020   Referral source: Sofiya Stewart,* : 1960                Medical Diagnosis: Pain in left shoulder [M25.512]  Payor: BLUE CROSS / Plan: [email or web address] [facility] / Product Type: PPO /  Onset Date:2020                Treatment Diagnosis: left shoulder pain   Prior Hospitalization: see medical history Provider#: [id number]   Medications: Verified on Patient summary List    Comorbidities: none per patient   Prior Level of Function: functionally independent and active. 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 1. Therapist to establish HEP for strength and ROM to improve ease with ADLs. MET - issued at evaluation  2.. Patient will be independent with HEP to improve carryover of functional gains with ADLs between visits. showed compliance with HEP provided at time of exam.    MET    Assessment/Summary of care: Mr. Sergo Bustillo was seen for evaluation and given HEP due to busy schedule. Pt was instructed to return if he continued to have limitations; pt has not returned to clinic, therefore he is DC at this time.      RECOMMENDATIONS:  [x]Discontinue therapy: [x]Patient has reached or is progressing toward set goals      []Patient is non-compliant or has abdicated      []Due to lack of appreciable progress towards set [id number] [place], PT 2020 10:32 AM

## 2020-10-08 ENCOUNTER — TELEPHONE (OUTPATIENT)
Dept: FAMILY MEDICINE CLINIC | Age: 60
End: 2020-10-08

## 2020-10-09 ENCOUNTER — OFFICE VISIT (OUTPATIENT)
Dept: FAMILY MEDICINE CLINIC | Age: 60
End: 2020-10-09
Payer: COMMERCIAL

## 2020-10-09 VITALS
OXYGEN SATURATION: 97 % | HEIGHT: 67 IN | HEART RATE: 68 BPM | DIASTOLIC BLOOD PRESSURE: 70 MMHG | SYSTOLIC BLOOD PRESSURE: 114 MMHG | WEIGHT: 133.6 LBS | RESPIRATION RATE: 16 BRPM | TEMPERATURE: 98.1 F | BODY MASS INDEX: 20.97 KG/M2

## 2020-10-09 DIAGNOSIS — Z00.00 WELL ADULT EXAM: Primary | ICD-10-CM

## 2020-10-09 DIAGNOSIS — Z12.5 PROSTATE CANCER SCREENING: ICD-10-CM

## 2020-10-09 DIAGNOSIS — Z13.1 SCREENING FOR DIABETES MELLITUS (DM): ICD-10-CM

## 2020-10-09 DIAGNOSIS — R31.29 MICROSCOPIC HEMATURIA: ICD-10-CM

## 2020-10-09 DIAGNOSIS — Z13.220 LIPID SCREENING: ICD-10-CM

## 2020-10-09 PROCEDURE — 99396 PREV VISIT EST AGE 40-64: CPT | Performed by: FAMILY MEDICINE

## 2020-10-09 NOTE — PATIENT INSTRUCTIONS
A Healthy Heart: Care Instructions Your Care Instructions Coronary artery disease, also called heart disease, occurs when a substance called plaque builds up in the vessels that supply oxygen-rich blood to your heart muscle. This can narrow the blood vessels and reduce blood flow. A heart attack happens when blood flow is completely blocked. A high-fat diet, smoking, and other factors increase the risk of heart disease. Your doctor has found that you have a chance of having heart disease. You can do lots of things to keep your heart healthy. It may not be easy, but you can change your diet, exercise more, and quit smoking. These steps really work to lower your chance of heart disease. Follow-up care is a key part of your treatment and safety. Be sure to make and go to all appointments, and call your doctor if you are having problems. It's also a good idea to know your test results and keep a list of the medicines you take. How can you care for yourself at home? Diet 
  · Use less salt when you cook and eat. This helps lower your blood pressure. Taste food before salting. Add only a little salt when you think you need it. With time, your taste buds will adjust to less salt.  
  · Eat fewer snack items, fast foods, canned soups, and other high-salt, high-fat, processed foods.  
  · Read food labels and try to avoid saturated and trans fats. They increase your risk of heart disease by raising cholesterol levels.  
  · Limit the amount of solid fat-butter, margarine, and shortening-you eat. Use olive, peanut, or canola oil when you cook. Bake, broil, and steam foods instead of frying them.  
  · Eat a variety of fruit and vegetables every day. Dark green, deep orange, red, or yellow fruits and vegetables are especially good for you.  Examples include spinach, carrots, peaches, and berries.  
  · Foods high in fiber can reduce your cholesterol and provide important vitamins and minerals. High-fiber foods include whole-grain cereals and breads, oatmeal, beans, brown rice, citrus fruits, and apples.  
  · Eat lean proteins. Heart-healthy proteins include seafood, lean meats and poultry, eggs, beans, peas, nuts, seeds, and soy products.  
  · Limit drinks and foods with added sugar. These include candy, desserts, and soda pop. Lifestyle changes 
  · If your doctor recommends it, get more exercise. Walking is a good choice. Bit by bit, increase the amount you walk every day. Try for at least 30 minutes on most days of the week. You also may want to swim, bike, or do other activities.  
  · Do not smoke. If you need help quitting, talk to your doctor about stop-smoking programs and medicines. These can increase your chances of quitting for good. Quitting smoking may be the most important step you can take to protect your heart. It is never too late to quit.  
  · Limit alcohol to 2 drinks a day for men and 1 drink a day for women. Too much alcohol can cause health problems.  
  · Manage other health problems such as diabetes, high blood pressure, and high cholesterol. If you think you may have a problem with alcohol or drug use, talk to your doctor. Medicines 
  · Take your medicines exactly as prescribed. Call your doctor if you think you are having a problem with your medicine.  
  · If your doctor recommends aspirin, take the amount directed each day. Make sure you take aspirin and not another kind of pain reliever, such as acetaminophen (Tylenol). When should you call for help? Call 911 if you have symptoms of a heart attack. These may include: 
  · Chest pain or pressure, or a strange feeling in the chest.  
  · Sweating.  
  · Shortness of breath.  
  · Pain, pressure, or a strange feeling in the back, neck, jaw, or upper belly or in one or both shoulders or arms.  
  · Lightheadedness or sudden weakness.  
  · A fast or irregular heartbeat. After you call 911, the  may tell you to chew 1 adult-strength or 2 to 4 low-dose aspirin. Wait for an ambulance. Do not try to drive yourself. Watch closely for changes in your health, and be sure to contact your doctor if you have any problems. Where can you learn more? Go to http://www.gray.com/ Enter I188 in the search box to learn more about \"A Healthy Heart: Care Instructions. \" Current as of: December 16, 2019               Content Version: 12.6 © 7862-2833 [employer]. Care instructions adapted under license by [employer] (which disclaims liability or warranty for this information). If you have questions about a medical condition or this instruction, always ask your healthcare professional. [address] any warranty or liability for your use of this information.

## 2020-10-09 NOTE — PROGRESS NOTES
1. Have you been to the ER, urgent care clinic since your last visit? Hospitalized since your last visit? No    2. Have you seen or consulted any other health care providers outside of the [address] [place] [address] since your last visit? Include any pap smears or colon screening. No    Chief Complaint   Patient presents with    Complete Physical     needs employer wellness form filled out       Patient declined flu vaccine.

## 2020-10-09 NOTE — PROGRESS NOTES
Subjective:   Kelly Benson is a 64 y.o. male presenting for his annual checkup. ROS:  Feeling well. No dyspnea or chest pain on exertion. No abdominal pain, change in bowel habits, black or bloody stools. No urinary tract or prostatic symptoms. No neurological complaints. Specific concerns today: routine work urinalysis showing microscopic hematuria, works with radiation exposure. Denies any pain, urinary symptoms. There are no active problems to display for this patient. Current Outpatient Medications   Medication Sig Dispense Refill    omeprazole magnesium (PRILOSEC PO) Take  by mouth. Allergies   Allergen Reactions    Amoxicillin Hives and Itching    Pcn [Penicillins] Hives and Itching     Past Medical History:   Diagnosis Date    H/O colonoscopy 2016    rpt 5 years,      Past Surgical History:   Procedure Laterality Date    APPENDECTOMY      HX COLONOSCOPY  2016    Dr. Vieira Eastern 1 polyp    HX RHINOPLASTY  2009    Dr. Zaina Dumont     Family History   Problem Relation Age of Onset    Hypertension Mother     Other Mother          from colitis complications     High Cholesterol Father     Diabetes Father     Heart Attack Paternal Grandmother     Heart Failure Paternal Grandmother      Social History     Tobacco Use    Smoking status: Never Smoker    Smokeless tobacco: Never Used   Substance Use Topics    Alcohol use: No          Objective:     Visit Vitals  /70 (BP 1 Location: Left arm, BP Patient Position: Sitting)   Pulse 68   Temp 98.1 °F (36.7 °C) (Oral)   Resp 16   Ht 5' 6.5\" (1.689 m)   Wt 133 lb 9.6 oz (60.6 kg)   SpO2 97%   BMI 21.24 kg/m²     The patient appears well, alert, oriented x 3, in no distress. ENT normal.  Neck supple. No adenopathy or thyromegaly. BRYCE. Lungs are clear, good air entry, no wheezes, rhonchi or rales. S1 and S2 normal, no murmurs, regular rate and rhythm.  Abdomen is soft without tenderness, guarding, mass or organomegaly. .  Extremities show no edema, normal peripheral pulses. Neurological is normal without focal findings. Assessment/Plan:   Diagnoses and all orders for this visit:    1. Well adult exam  healthy adult male  routine labs ordered, have labs drawn prior to [facility], call if any problems. reviewed diet, exercise and weight control. CRCS 2016 update 2026    2. Microscopic hematuria  -     REFERRAL TO UROLOGY  Will defer work clearance with urology    3. Prostate cancer screening  Shared decision-     PSA SCREENING (SCREENING); Future    4. Screening for diabetes mellitus (DM)  -     METABOLIC PANEL, COMPREHENSIVE; Future  -     HEMOGLOBIN A1C WITH EAG; Future    5. Lipid screening  -     LIPID PANEL; Future    Ff-up in 1 year or sooner prn    Patient/guardian understands plan of care. Patient has provided input and agrees with goals. Future labs to be discussed on next visit.

## 2020-10-14 ENCOUNTER — HOSPITAL ENCOUNTER (OUTPATIENT)
Dept: LAB | Age: 60
Discharge: HOME | End: 2020-10-14
Payer: COMMERCIAL

## 2020-10-14 DIAGNOSIS — Z13.1 SCREENING FOR DIABETES MELLITUS (DM): ICD-10-CM

## 2020-10-14 DIAGNOSIS — Z12.5 PROSTATE CANCER SCREENING: ICD-10-CM

## 2020-10-14 DIAGNOSIS — Z13.220 LIPID SCREENING: ICD-10-CM

## 2020-10-14 LAB
ALBUMIN SERPL-MCNC: 3.6 G/DL (ref 3.4–5)
ALBUMIN/GLOB SERPL: 1.3 {RATIO} (ref 0.8–1.7)
ALP SERPL-CCNC: 42 U/L (ref 45–117)
ALT SERPL-CCNC: 20 U/L (ref 16–61)
ANION GAP SERPL CALC-SCNC: 3 MMOL/L (ref 3–18)
AST SERPL-CCNC: 11 U/L (ref 10–38)
BILIRUB SERPL-MCNC: 0.4 MG/DL (ref 0.2–1)
BUN SERPL-MCNC: 17 MG/DL (ref 7–18)
BUN/CREAT SERPL: 17 (ref 12–20)
CALCIUM SERPL-MCNC: 8.6 MG/DL (ref 8.5–10.1)
CHLORIDE SERPL-SCNC: 112 MMOL/L (ref 100–111)
CHOLEST SERPL-MCNC: 172 MG/DL
CO2 SERPL-SCNC: 30 MMOL/L (ref 21–32)
CREAT SERPL-MCNC: 0.99 MG/DL (ref 0.6–1.3)
EST. AVERAGE GLUCOSE BLD GHB EST-MCNC: 108 MG/DL
GLOBULIN SER CALC-MCNC: 2.8 G/DL (ref 2–4)
GLUCOSE SERPL-MCNC: 99 MG/DL (ref 74–99)
HBA1C MFR BLD: 5.4 % (ref 4.2–5.6)
HDLC SERPL-MCNC: 44 MG/DL (ref 40–60)
HDLC SERPL: 3.9 {RATIO} (ref 0–5)
LDLC SERPL CALC-MCNC: 110 MG/DL (ref 0–100)
LIPID PROFILE,FLP: ABNORMAL
POTASSIUM SERPL-SCNC: 4.8 MMOL/L (ref 3.5–5.5)
PROT SERPL-MCNC: 6.4 G/DL (ref 6.4–8.2)
PSA SERPL-MCNC: 0.5 NG/ML (ref 0–4)
SODIUM SERPL-SCNC: 145 MMOL/L (ref 136–145)
TRIGL SERPL-MCNC: 90 MG/DL (ref ?–150)
VLDLC SERPL CALC-MCNC: 18 MG/DL

## 2020-10-14 PROCEDURE — 36415 COLL VENOUS BLD VENIPUNCTURE: CPT

## 2020-10-14 PROCEDURE — 83036 HEMOGLOBIN GLYCOSYLATED A1C: CPT

## 2020-10-14 PROCEDURE — 80053 COMPREHEN METABOLIC PANEL: CPT

## 2020-10-14 PROCEDURE — 80061 LIPID PANEL: CPT

## 2020-10-14 PROCEDURE — 84153 ASSAY OF PSA TOTAL: CPT

## 2020-11-05 ENCOUNTER — HOSPITAL ENCOUNTER (OUTPATIENT)
Dept: ULTRASOUND IMAGING | Age: 60
Discharge: HOME | End: 2020-11-05
Attending: UROLOGY
Payer: COMMERCIAL

## 2020-11-05 DIAGNOSIS — R31.29 MICROHEMATURIA: ICD-10-CM

## 2020-11-05 PROCEDURE — 76770 US EXAM ABDO BACK WALL COMP: CPT

## 2023-03-13 RX ORDER — OMEPRAZOLE 20 MG/1
20 TABLET, DELAYED RELEASE ORAL DAILY
COMMUNITY
End: 2023-03-14 | Stop reason: CLARIF

## 2023-03-13 NOTE — PROGRESS NOTES
Chief Complaint   Patient presents with    Anxiety    Depression     Having issues at work     Insomnia     Sleep disturbance     Stress         1. \"Have you been to the ER, urgent care clinic since your last visit? Hospitalized since your last visit? \" No    2. \"Have you seen or consulted any other health care providers outside of the [address] [place] [address] since your last visit? \" No     3. For patients aged 39-70: Has the patient had a colonoscopy / FIT/ Cologuard? Yes - no Care Gap present      If the patient is female:    4. For patients aged 41-77: Has the patient had a mammogram within the past 2 years? NA - based on age or sex      11. For patients aged 21-65: Has the patient had a pap smear?  NA - based on age or sex

## 2023-03-14 ENCOUNTER — OFFICE VISIT (OUTPATIENT)
Age: 63
End: 2023-03-14
Payer: COMMERCIAL

## 2023-03-14 VITALS
SYSTOLIC BLOOD PRESSURE: 126 MMHG | BODY MASS INDEX: 22.14 KG/M2 | HEIGHT: 67 IN | RESPIRATION RATE: 18 BRPM | OXYGEN SATURATION: 97 % | WEIGHT: 141.06 LBS | TEMPERATURE: 98.8 F | HEART RATE: 76 BPM | DIASTOLIC BLOOD PRESSURE: 82 MMHG

## 2023-03-14 DIAGNOSIS — Z13.1 SCREENING FOR DIABETES MELLITUS (DM): ICD-10-CM

## 2023-03-14 DIAGNOSIS — Z13.220 LIPID SCREENING: ICD-10-CM

## 2023-03-14 DIAGNOSIS — Z12.5 SCREENING PSA (PROSTATE SPECIFIC ANTIGEN): ICD-10-CM

## 2023-03-14 DIAGNOSIS — F43.23 ADJUSTMENT DISORDER WITH MIXED ANXIETY AND DEPRESSED MOOD: Primary | ICD-10-CM

## 2023-03-14 PROCEDURE — 99214 OFFICE O/P EST MOD 30 MIN: CPT | Performed by: FAMILY MEDICINE

## 2023-03-14 RX ORDER — CLOBETASOL PROPIONATE 0.5 MG/G
CREAM TOPICAL 2 TIMES DAILY
COMMUNITY
Start: 2023-02-02

## 2023-03-14 RX ORDER — CETIRIZINE HYDROCHLORIDE 10 MG/1
10 TABLET ORAL DAILY
COMMUNITY

## 2023-03-14 RX ORDER — FAMOTIDINE 20 MG/1
20 TABLET, FILM COATED ORAL 2 TIMES DAILY
COMMUNITY

## 2023-03-14 RX ORDER — LANOLIN ALCOHOL/MO/W.PET/CERES
1000 CREAM (GRAM) TOPICAL DAILY
COMMUNITY

## 2023-03-14 SDOH — ECONOMIC STABILITY: INCOME INSECURITY: HOW HARD IS IT FOR YOU TO PAY FOR THE VERY BASICS LIKE FOOD, HOUSING, MEDICAL CARE, AND HEATING?: NOT HARD AT ALL

## 2023-03-14 SDOH — ECONOMIC STABILITY: HOUSING INSECURITY
IN THE LAST 12 MONTHS, WAS THERE A TIME WHEN YOU DID NOT HAVE A STEADY PLACE TO SLEEP OR SLEPT IN A SHELTER (INCLUDING NOW)?: NO

## 2023-03-14 SDOH — ECONOMIC STABILITY: FOOD INSECURITY: WITHIN THE PAST 12 MONTHS, THE FOOD YOU BOUGHT JUST DIDN'T LAST AND YOU DIDN'T HAVE MONEY TO GET MORE.: NEVER TRUE

## 2023-03-14 SDOH — HEALTH STABILITY: PHYSICAL HEALTH: ON AVERAGE, HOW MANY DAYS PER WEEK DO YOU ENGAGE IN MODERATE TO STRENUOUS EXERCISE (LIKE A BRISK WALK)?: 3 DAYS

## 2023-03-14 SDOH — HEALTH STABILITY: PHYSICAL HEALTH: ON AVERAGE, HOW MANY MINUTES DO YOU ENGAGE IN EXERCISE AT THIS LEVEL?: 60 MIN

## 2023-03-14 SDOH — ECONOMIC STABILITY: FOOD INSECURITY: WITHIN THE PAST 12 MONTHS, YOU WORRIED THAT YOUR FOOD WOULD RUN OUT BEFORE YOU GOT MONEY TO BUY MORE.: NEVER TRUE

## 2023-03-14 ASSESSMENT — PATIENT HEALTH QUESTIONNAIRE - PHQ9
3. TROUBLE FALLING OR STAYING ASLEEP: 1
1. LITTLE INTEREST OR PLEASURE IN DOING THINGS: 0
9. THOUGHTS THAT YOU WOULD BE BETTER OFF DEAD, OR OF HURTING YOURSELF: 1
5. POOR APPETITE OR OVEREATING: 0
2. FEELING DOWN, DEPRESSED OR HOPELESS: 2
SUM OF ALL RESPONSES TO PHQ QUESTIONS 1-9: 5
10. IF YOU CHECKED OFF ANY PROBLEMS, HOW DIFFICULT HAVE THESE PROBLEMS MADE IT FOR YOU TO DO YOUR WORK, TAKE CARE OF THINGS AT HOME, OR GET ALONG WITH OTHER PEOPLE: 1
7. TROUBLE CONCENTRATING ON THINGS, SUCH AS READING THE NEWSPAPER OR WATCHING TELEVISION: 1
SUM OF ALL RESPONSES TO PHQ QUESTIONS 1-9: 6
8. MOVING OR SPEAKING SO SLOWLY THAT OTHER PEOPLE COULD HAVE NOTICED. OR THE OPPOSITE, BEING SO FIGETY OR RESTLESS THAT YOU HAVE BEEN MOVING AROUND A LOT MORE THAN USUAL: 0
6. FEELING BAD ABOUT YOURSELF - OR THAT YOU ARE A FAILURE OR HAVE LET YOURSELF OR YOUR FAMILY DOWN: 1
SUM OF ALL RESPONSES TO PHQ QUESTIONS 1-9: 6
4. FEELING TIRED OR HAVING LITTLE ENERGY: 0
SUM OF ALL RESPONSES TO PHQ9 QUESTIONS 1 & 2: 2
SUM OF ALL RESPONSES TO PHQ QUESTIONS 1-9: 6

## 2023-03-14 ASSESSMENT — ANXIETY QUESTIONNAIRES
7. FEELING AFRAID AS IF SOMETHING AWFUL MIGHT HAPPEN: 0
1. FEELING NERVOUS, ANXIOUS, OR ON EDGE: 1
IF YOU CHECKED OFF ANY PROBLEMS ON THIS QUESTIONNAIRE, HOW DIFFICULT HAVE THESE PROBLEMS MADE IT FOR YOU TO DO YOUR WORK, TAKE CARE OF THINGS AT HOME, OR GET ALONG WITH OTHER PEOPLE: SOMEWHAT DIFFICULT
6. BECOMING EASILY ANNOYED OR IRRITABLE: 1
5. BEING SO RESTLESS THAT IT IS HARD TO SIT STILL: 1
3. WORRYING TOO MUCH ABOUT DIFFERENT THINGS: 0
2. NOT BEING ABLE TO STOP OR CONTROL WORRYING: 0

## 2023-03-14 NOTE — PROGRESS NOTES
Cierra George, 63 y.o.,  male    SUBJECTIVE  Depressed mood    Have not seen pt since 2020. Reports work conflicts that has become significantly worse past few weeks- unable to sleep, depressed/anxious mood, fleeting passive suicidal ideation, no active plan. PHQ 9 - 6. He believes that if his request to be moved to a different location, not being under supervisor he has a difficult relationship with is approved, then this situational stressor will improve. However, he does not think he can keep working if results are unfavorable. He has good support system at home and work. He has started counseling thru employer. Says he is otherwise healthy, he was getting physicals thru his employer past several years with good results he says. ROS:  See HPI, all others negative        There is no problem list on file for this patient. Current Outpatient Medications   Medication Sig Dispense Refill    clobetasol (TEMOVATE) 0.05 % cream Apply topically 2 times daily      cetirizine (ZYRTEC) 10 MG tablet Take 10 mg by mouth daily      vitamin B-12 (CYANOCOBALAMIN) 1000 MCG tablet Take 1,000 mcg by mouth daily      Multiple Vitamins-Minerals (MULTIVITAMINS/MINERALS ADULT PO) Take 1 capsule by mouth daily      Susana Root 500 MG CAPS Take by mouth      famotidine (PEPCID) 20 MG tablet Take 20 mg by mouth 2 times daily       No current facility-administered medications for this visit. Allergies   Allergen Reactions    Penicillins Hives, Itching and Rash       Past Medical History:   Diagnosis Date    H/O colonoscopy 01/05/2016    rpt 5 years, 2021       Social History     Socioeconomic History    Marital status:      Spouse name: Not on file    Number of children: Not on file    Years of education: Not on file    Highest education level: Not on file   Occupational History    Not on file   Tobacco Use    Smoking status: Never    Smokeless tobacco: Never   Substance and Sexual Activity    Alcohol use:  No Drug use: No    Sexual activity: Not on file   Other Topics Concern    Not on file   Social History Narrative    Not on file     Social Determinants of Health     Financial Resource Strain: Low Risk     Difficulty of Paying Living Expenses: Not hard at all   Food Insecurity: No Food Insecurity    Worried About [address] in the Last Year: Never true    [address] [facility] [address] in the Last Year: Never true   Transportation Needs: Unknown    Lack of Transportation (Medical): Not on file    Lack of Transportation (Non-Medical): No   Physical Activity: Sufficiently Active    Days of Exercise per Week: 3 days    Minutes of Exercise per Session: 60 min   Stress: Not on file   Social Connections: Not on file   Intimate Partner Violence: Not on file   Housing Stability: Unknown    Unable to Pay for Housing in the Last Year: Not on file    Number of Places Lived in the Last Year: Not on file    Unstable Housing in the Last Year: No       Family History   Problem Relation Age of Onset    Heart Failure Paternal Grandmother     Heart Attack Paternal Grandmother     Diabetes Father     High Cholesterol Father     Hypertension Mother     Other Mother          from colitis complications          OBJECTIVE    Physical Exam:     /82 (Site: Left Upper Arm, Position: Sitting, Cuff Size: Large Adult)   Pulse 76   Temp 98.8 °F (37.1 °C) (Temporal)   Resp 18   Ht 5' 6.5\" (1.689 m)   Wt 141 lb 1 oz (64 kg)   SpO2 97%   BMI 22.43 kg/m²     General: alert, well-appearing,  in no apparent distress or pain  Head: atraumatic.  Non-tender maxillary and frontal sinuses  CVS: normal rate, regular rhythm, distinct S1 and S2  Lungs:clear to ausculation bilaterally, no crackles, wheezing or rhonchi noted  Extremities: no edema, no cyanosis, MSK grossly normal  Skin: warm, no lesions, rashes noted  Psych:  mood and affect normal    PHQ-9 Total Score: 6 (3/14/2023  8:46 AM)  Thoughts that you would be better off dead, or of hurting yourself in some way: 1 (3/14/2023  8:46 AM)        ASSESSMENT/PLAN  Mckayla Tatum was seen today for anxiety, depression, insomnia and stress. Diagnoses and all orders for this visit:    Adjustment disorder with mixed anxiety and depressed mood  Discussed behavioral strategies- start daily exercise, good sleep hygiene  Verbal contract on SI  Cont talk therapy  -     External Referral To Psychiatry    Lipid screening  -     Lipid Panel; Future    Screening for diabetes mellitus (DM)  -     Comprehensive Metabolic Panel; Future  -     Hemoglobin A1C; Future    Screening PSA (prostate specific antigen)  Shared decision  -     PSA Screening; Future    Ff-up in 4 weeks, plan on CPE    Patient understands plan of care. Patient has provided input and agrees with goals.

## 2023-05-16 NOTE — PROGRESS NOTES
1. \"Have you been to the ER, urgent care clinic since your last visit? Hospitalized since your last visit? \" No    2. \"Have you seen or consulted any other health care providers outside of the [address] [place] [address] since your last visit? \" Telehealth counseling LOV: two weeks ago    3. For patients aged 39-70: Has the patient had a colonoscopy / FIT/ Cologuard?  Yes - no Care Gap present 1/12/16 colonoscopy    Chief Complaint   Patient presents with    Annual Exam

## 2023-05-17 ENCOUNTER — OFFICE VISIT (OUTPATIENT)
Age: 63
End: 2023-05-17
Payer: COMMERCIAL

## 2023-05-17 ENCOUNTER — HOSPITAL ENCOUNTER (OUTPATIENT)
Facility: HOSPITAL | Age: 63
Discharge: HOME | End: 2023-05-20
Payer: COMMERCIAL

## 2023-05-17 VITALS
DIASTOLIC BLOOD PRESSURE: 80 MMHG | SYSTOLIC BLOOD PRESSURE: 128 MMHG | HEIGHT: 66 IN | OXYGEN SATURATION: 99 % | HEART RATE: 61 BPM | RESPIRATION RATE: 16 BRPM | TEMPERATURE: 97.4 F | BODY MASS INDEX: 21.92 KG/M2 | WEIGHT: 136.4 LBS

## 2023-05-17 DIAGNOSIS — Z12.11 COLON CANCER SCREENING: ICD-10-CM

## 2023-05-17 DIAGNOSIS — Z00.00 WELL ADULT EXAM: Primary | ICD-10-CM

## 2023-05-17 DIAGNOSIS — F43.23 ADJUSTMENT DISORDER WITH MIXED ANXIETY AND DEPRESSED MOOD: ICD-10-CM

## 2023-05-17 LAB
ALBUMIN SERPL-MCNC: 3.7 G/DL (ref 3.4–5)
ALBUMIN/GLOB SERPL: 1.3 (ref 0.8–1.7)
ALP SERPL-CCNC: 49 U/L (ref 45–117)
ALT SERPL-CCNC: 22 U/L (ref 16–61)
ANION GAP SERPL CALC-SCNC: 3 MMOL/L (ref 3–18)
AST SERPL-CCNC: 20 U/L (ref 10–38)
BILIRUB SERPL-MCNC: 0.6 MG/DL (ref 0.2–1)
BUN SERPL-MCNC: 20 MG/DL (ref 7–18)
BUN/CREAT SERPL: 19 (ref 12–20)
CALCIUM SERPL-MCNC: 8.8 MG/DL (ref 8.5–10.1)
CHLORIDE SERPL-SCNC: 109 MMOL/L (ref 100–111)
CHOLEST SERPL-MCNC: 191 MG/DL
CO2 SERPL-SCNC: 29 MMOL/L (ref 21–32)
CREAT SERPL-MCNC: 1.03 MG/DL (ref 0.6–1.3)
EST. AVERAGE GLUCOSE BLD GHB EST-MCNC: 105 MG/DL
GLOBULIN SER CALC-MCNC: 2.8 G/DL (ref 2–4)
GLUCOSE SERPL-MCNC: 103 MG/DL (ref 74–99)
HBA1C MFR BLD: 5.3 % (ref 4.2–5.6)
HDLC SERPL-MCNC: 47 MG/DL (ref 40–60)
HDLC SERPL: 4.1 (ref 0–5)
LDLC SERPL CALC-MCNC: 126.2 MG/DL (ref 0–100)
LIPID PANEL: ABNORMAL
POTASSIUM SERPL-SCNC: 5 MMOL/L (ref 3.5–5.5)
PROT SERPL-MCNC: 6.5 G/DL (ref 6.4–8.2)
PSA SERPL-MCNC: 0.6 NG/ML (ref 0–4)
SODIUM SERPL-SCNC: 141 MMOL/L (ref 136–145)
TRIGL SERPL-MCNC: 89 MG/DL
VLDLC SERPL CALC-MCNC: 17.8 MG/DL

## 2023-05-17 PROCEDURE — 80061 LIPID PANEL: CPT

## 2023-05-17 PROCEDURE — 99396 PREV VISIT EST AGE 40-64: CPT | Performed by: FAMILY MEDICINE

## 2023-05-17 PROCEDURE — 80053 COMPREHEN METABOLIC PANEL: CPT

## 2023-05-17 PROCEDURE — 84153 ASSAY OF PSA TOTAL: CPT

## 2023-05-17 PROCEDURE — 36415 COLL VENOUS BLD VENIPUNCTURE: CPT

## 2023-05-17 PROCEDURE — 83036 HEMOGLOBIN GLYCOSYLATED A1C: CPT

## 2023-05-17 ASSESSMENT — PATIENT HEALTH QUESTIONNAIRE - PHQ9
SUM OF ALL RESPONSES TO PHQ QUESTIONS 1-9: 1
2. FEELING DOWN, DEPRESSED OR HOPELESS: 1
SUM OF ALL RESPONSES TO PHQ QUESTIONS 1-9: 1

## 2023-05-17 NOTE — PROGRESS NOTES
Monique Varela, 63 y.o.,  male    SUBJECTIVE  CPE    Pt reports healthy lifestyle. Mood has greatly improved since last visit, he was transferred to a new location and having constructive conversations with staff. Metabolic screening labs still pending results, drawn today    ROS:  See HPI, all others negative        There is no problem list on file for this patient. Current Outpatient Medications   Medication Sig Dispense Refill    clobetasol (TEMOVATE) 0.05 % cream Apply topically 2 times daily      cetirizine (ZYRTEC) 10 MG tablet Take 1 tablet by mouth daily      vitamin B-12 (CYANOCOBALAMIN) 1000 MCG tablet Take 1 tablet by mouth daily      Multiple Vitamins-Minerals (MULTIVITAMINS/MINERALS ADULT PO) Take 1 capsule by mouth daily      Susana Root 500 MG CAPS Take by mouth      famotidine (PEPCID) 20 MG tablet Take 1 tablet by mouth 2 times daily       No current facility-administered medications for this visit.        Allergies   Allergen Reactions    Penicillins Hives, Itching and Rash       Past Medical History:   Diagnosis Date    H/O colonoscopy 01/05/2016    rpt 5 years, 2021       Social History     Socioeconomic History    Marital status:      Spouse name: Not on file    Number of children: Not on file    Years of education: Not on file    Highest education level: Not on file   Occupational History    Not on file   Tobacco Use    Smoking status: Never    Smokeless tobacco: Never   Substance and Sexual Activity    Alcohol use: No    Drug use: No    Sexual activity: Not on file   Other Topics Concern    Not on file   Social History Narrative    Not on file     Social Determinants of Health     Financial Resource Strain: Low Risk     Difficulty of Paying Living Expenses: Not hard at all   Food Insecurity: No Food Insecurity    Worried About Running Out of Food in the Last Year: Never true    [address] in the Last Year: Never true   Transportation Needs: Unknown    Lack of Transportation

## 2023-06-06 LAB — HEMOCCULT STL QL IA: NEGATIVE

## 2024-01-02 ENCOUNTER — TELEPHONE (OUTPATIENT)
Age: 64
End: 2024-01-02

## 2024-01-02 DIAGNOSIS — U07.1 POSITIVE SELF-ADMINISTERED ANTIGEN TEST FOR COVID-19: Primary | ICD-10-CM

## 2024-01-02 RX ORDER — ALBUTEROL SULFATE 90 UG/1
2 AEROSOL, METERED RESPIRATORY (INHALATION) 4 TIMES DAILY PRN
Qty: 18 G | Refills: 0 | Status: SHIPPED | OUTPATIENT
Start: 2024-01-02

## 2024-01-02 NOTE — TELEPHONE ENCOUNTER
Pt's wife called stating her  tested positive for Covid last night using a home test. Pt has chest congestion, cough, and low grade fever that started Sunday. Pt's wife wants to know is their anything he can take to get some relief. Please advise.

## 2024-02-29 ENCOUNTER — OFFICE VISIT (OUTPATIENT)
Age: 64
End: 2024-02-29
Payer: COMMERCIAL

## 2024-02-29 ENCOUNTER — TELEPHONE (OUTPATIENT)
Age: 64
End: 2024-02-29

## 2024-02-29 VITALS
RESPIRATION RATE: 18 BRPM | HEIGHT: 66 IN | SYSTOLIC BLOOD PRESSURE: 160 MMHG | BODY MASS INDEX: 21.38 KG/M2 | TEMPERATURE: 98.4 F | OXYGEN SATURATION: 98 % | WEIGHT: 133 LBS | DIASTOLIC BLOOD PRESSURE: 92 MMHG | HEART RATE: 88 BPM

## 2024-02-29 DIAGNOSIS — R03.0 ELEVATED BP WITHOUT DIAGNOSIS OF HYPERTENSION: Primary | ICD-10-CM

## 2024-02-29 DIAGNOSIS — F43.23 ADJUSTMENT DISORDER WITH MIXED ANXIETY AND DEPRESSED MOOD: ICD-10-CM

## 2024-02-29 PROCEDURE — 99214 OFFICE O/P EST MOD 30 MIN: CPT | Performed by: FAMILY MEDICINE

## 2024-02-29 RX ORDER — METOPROLOL TARTRATE 50 MG/1
50 TABLET, FILM COATED ORAL 2 TIMES DAILY
Qty: 60 TABLET | Refills: 0 | Status: SHIPPED | OUTPATIENT
Start: 2024-02-29

## 2024-02-29 RX ORDER — AMLODIPINE BESYLATE 5 MG/1
5 TABLET ORAL DAILY
Qty: 60 TABLET | Refills: 0 | Status: SHIPPED | OUTPATIENT
Start: 2024-02-29

## 2024-02-29 ASSESSMENT — PATIENT HEALTH QUESTIONNAIRE - PHQ9
SUM OF ALL RESPONSES TO PHQ QUESTIONS 1-9: 0
SUM OF ALL RESPONSES TO PHQ QUESTIONS 1-9: 0
2. FEELING DOWN, DEPRESSED OR HOPELESS: 0
1. LITTLE INTEREST OR PLEASURE IN DOING THINGS: 0
SUM OF ALL RESPONSES TO PHQ QUESTIONS 1-9: 0
SUM OF ALL RESPONSES TO PHQ QUESTIONS 1-9: 0
SUM OF ALL RESPONSES TO PHQ9 QUESTIONS 1 & 2: 0

## 2024-02-29 NOTE — PROGRESS NOTES
Boyd Howe, 63 y.o.,  male    SUBJECTIVE  Elevated BP x few weeks    Reports BP readings 160-180's, some throbbing headache at times. No chest pain, sob, blurring of vision. Reports significant increase in stress at work, ,more anxiety. Has been following with his therapist.  No changes to lifestyle, medication, diet    ROS:  See HPI, all others negative        There is no problem list on file for this patient.      Current Outpatient Medications   Medication Sig Dispense Refill    amLODIPine (NORVASC) 5 MG tablet Take 1 tablet by mouth daily 60 tablet 0    metoprolol tartrate (LOPRESSOR) 50 MG tablet Take 1 tablet by mouth 2 times daily 60 tablet 0     No current facility-administered medications for this visit.       Allergies   Allergen Reactions    Penicillins Hives, Itching and Rash       Past Medical History:   Diagnosis Date    H/O colonoscopy 01/05/2016    rpt 5 years, 2021       Social History     Socioeconomic History    Marital status:      Spouse name: Not on file    Number of children: Not on file    Years of education: Not on file    Highest education level: Not on file   Occupational History    Not on file   Tobacco Use    Smoking status: Never    Smokeless tobacco: Never   Substance and Sexual Activity    Alcohol use: No    Drug use: No    Sexual activity: Not on file   Other Topics Concern    Not on file   Social History Narrative    Not on file     Social Determinants of Health     Financial Resource Strain: Low Risk  (3/14/2023)    Overall Financial Resource Strain (CARDIA)     Difficulty of Paying Living Expenses: Not hard at all   Food Insecurity: Not on file (3/14/2023)   Transportation Needs: Unknown (3/14/2023)    PRAPARE - Transportation     Lack of Transportation (Medical): Not on file     Lack of Transportation (Non-Medical): No   Physical Activity: Sufficiently Active (3/14/2023)    Exercise Vital Sign     Days of Exercise per Week: 3 days     Minutes of Exercise per

## 2024-02-29 NOTE — TELEPHONE ENCOUNTER
Patients spouse called the office stating patient is having issue with his BP being elevated over the couple of months. Running in the 150's-180's/ 100's-130's.   Patient is complaining of a headache.   No Chest pain, left arm pain, no jaw pain. Denies blurred vision or dizziness.   ER precautions have been reviewed with patients spouse and patient.  Patient has been scheduled with Dr. Be for today at 11:45 am

## 2024-02-29 NOTE — PROGRESS NOTES
\"Have you been to the ER, urgent care clinic since your last visit?  Hospitalized since your last visit?\"    NO    “Have you seen or consulted any other health care providers outside of [facility] since your last visit?”    NO

## 2024-04-06 ENCOUNTER — HOSPITAL ENCOUNTER (OUTPATIENT)
Facility: HOSPITAL | Age: 64
Discharge: HOME | End: 2024-04-09
Payer: COMMERCIAL

## 2024-04-06 LAB
ALBUMIN SERPL-MCNC: 3.9 G/DL (ref 3.4–5)
ALBUMIN/GLOB SERPL: 1.2 (ref 0.8–1.7)
ALP SERPL-CCNC: 47 U/L (ref 45–117)
ALT SERPL-CCNC: 31 U/L (ref 16–61)
ANION GAP SERPL CALC-SCNC: 1 MMOL/L (ref 3–18)
AST SERPL-CCNC: 20 U/L (ref 10–38)
BASOPHILS # BLD: 0 K/UL (ref 0–0.1)
BASOPHILS NFR BLD: 1 % (ref 0–2)
BILIRUB SERPL-MCNC: 0.6 MG/DL (ref 0.2–1)
BUN SERPL-MCNC: 21 MG/DL (ref 7–18)
BUN/CREAT SERPL: 19 (ref 12–20)
CALCIUM SERPL-MCNC: 8.7 MG/DL (ref 8.5–10.1)
CHLORIDE SERPL-SCNC: 109 MMOL/L (ref 100–111)
CHOLEST SERPL-MCNC: 202 MG/DL
CO2 SERPL-SCNC: 30 MMOL/L (ref 21–32)
CREAT SERPL-MCNC: 1.12 MG/DL (ref 0.6–1.3)
DIFFERENTIAL METHOD BLD: ABNORMAL
EOSINOPHIL # BLD: 0.1 K/UL (ref 0–0.4)
EOSINOPHIL NFR BLD: 2 % (ref 0–5)
ERYTHROCYTE [DISTWIDTH] IN BLOOD BY AUTOMATED COUNT: 12.7 % (ref 11.6–14.5)
GLOBULIN SER CALC-MCNC: 3.3 G/DL (ref 2–4)
GLUCOSE SERPL-MCNC: 102 MG/DL (ref 74–99)
HCT VFR BLD AUTO: 49.3 % (ref 36–48)
HDLC SERPL-MCNC: 46 MG/DL (ref 40–60)
HDLC SERPL: 4.4 (ref 0–5)
HGB BLD-MCNC: 16.2 G/DL (ref 13–16)
IMM GRANULOCYTES # BLD AUTO: 0 K/UL (ref 0–0.04)
IMM GRANULOCYTES NFR BLD AUTO: 0 % (ref 0–0.5)
LDLC SERPL CALC-MCNC: 128 MG/DL (ref 0–100)
LIPID PANEL: ABNORMAL
LYMPHOCYTES # BLD: 1.2 K/UL (ref 0.9–3.6)
LYMPHOCYTES NFR BLD: 19 % (ref 21–52)
MCH RBC QN AUTO: 29.4 PG (ref 24–34)
MCHC RBC AUTO-ENTMCNC: 32.9 G/DL (ref 31–37)
MCV RBC AUTO: 89.5 FL (ref 78–100)
MONOCYTES # BLD: 0.4 K/UL (ref 0.05–1.2)
MONOCYTES NFR BLD: 7 % (ref 3–10)
NEUTS SEG # BLD: 4.4 K/UL (ref 1.8–8)
NEUTS SEG NFR BLD: 71 % (ref 40–73)
NRBC # BLD: 0 K/UL (ref 0–0.01)
NRBC BLD-RTO: 0 PER 100 WBC
PLATELET # BLD AUTO: 222 K/UL (ref 135–420)
PMV BLD AUTO: 10.5 FL (ref 9.2–11.8)
POTASSIUM SERPL-SCNC: 5.1 MMOL/L (ref 3.5–5.5)
PROT SERPL-MCNC: 7.2 G/DL (ref 6.4–8.2)
RBC # BLD AUTO: 5.51 M/UL (ref 4.35–5.65)
SODIUM SERPL-SCNC: 140 MMOL/L (ref 136–145)
TRIGL SERPL-MCNC: 140 MG/DL
TSH SERPL DL<=0.05 MIU/L-ACNC: 2.23 UIU/ML (ref 0.36–3.74)
VLDLC SERPL CALC-MCNC: 28 MG/DL
WBC # BLD AUTO: 6.2 K/UL (ref 4.6–13.2)

## 2024-04-06 PROCEDURE — 85025 COMPLETE CBC W/AUTO DIFF WBC: CPT

## 2024-04-06 PROCEDURE — 80053 COMPREHEN METABOLIC PANEL: CPT

## 2024-04-06 PROCEDURE — 84443 ASSAY THYROID STIM HORMONE: CPT

## 2024-04-06 PROCEDURE — 36415 COLL VENOUS BLD VENIPUNCTURE: CPT

## 2024-04-06 PROCEDURE — 80061 LIPID PANEL: CPT

## 2024-04-23 SDOH — ECONOMIC STABILITY: FOOD INSECURITY: WITHIN THE PAST 12 MONTHS, THE FOOD YOU BOUGHT JUST DIDN'T LAST AND YOU DIDN'T HAVE MONEY TO GET MORE.: NEVER TRUE

## 2024-04-23 SDOH — ECONOMIC STABILITY: FOOD INSECURITY: WITHIN THE PAST 12 MONTHS, YOU WORRIED THAT YOUR FOOD WOULD RUN OUT BEFORE YOU GOT MONEY TO BUY MORE.: NEVER TRUE

## 2024-04-23 SDOH — ECONOMIC STABILITY: INCOME INSECURITY: HOW HARD IS IT FOR YOU TO PAY FOR THE VERY BASICS LIKE FOOD, HOUSING, MEDICAL CARE, AND HEATING?: NOT HARD AT ALL

## 2024-04-23 SDOH — ECONOMIC STABILITY: TRANSPORTATION INSECURITY
IN THE PAST 12 MONTHS, HAS LACK OF TRANSPORTATION KEPT YOU FROM MEETINGS, WORK, OR FROM GETTING THINGS NEEDED FOR DAILY LIVING?: NO

## 2024-04-24 ENCOUNTER — OFFICE VISIT (OUTPATIENT)
Age: 64
End: 2024-04-24
Payer: COMMERCIAL

## 2024-04-24 VITALS
TEMPERATURE: 97 F | BODY MASS INDEX: 22.02 KG/M2 | HEART RATE: 78 BPM | SYSTOLIC BLOOD PRESSURE: 122 MMHG | RESPIRATION RATE: 16 BRPM | WEIGHT: 137 LBS | OXYGEN SATURATION: 98 % | HEIGHT: 66 IN | DIASTOLIC BLOOD PRESSURE: 68 MMHG

## 2024-04-24 DIAGNOSIS — I10 PRIMARY HYPERTENSION: Primary | ICD-10-CM

## 2024-04-24 DIAGNOSIS — R73.01 IFG (IMPAIRED FASTING GLUCOSE): ICD-10-CM

## 2024-04-24 DIAGNOSIS — Z12.5 SCREENING PSA (PROSTATE SPECIFIC ANTIGEN): ICD-10-CM

## 2024-04-24 DIAGNOSIS — K21.9 GASTROESOPHAGEAL REFLUX DISEASE, UNSPECIFIED WHETHER ESOPHAGITIS PRESENT: ICD-10-CM

## 2024-04-24 DIAGNOSIS — E78.00 PURE HYPERCHOLESTEROLEMIA: ICD-10-CM

## 2024-04-24 PROCEDURE — 3078F DIAST BP <80 MM HG: CPT | Performed by: FAMILY MEDICINE

## 2024-04-24 PROCEDURE — 3074F SYST BP LT 130 MM HG: CPT | Performed by: FAMILY MEDICINE

## 2024-04-24 PROCEDURE — 99214 OFFICE O/P EST MOD 30 MIN: CPT | Performed by: FAMILY MEDICINE

## 2024-04-24 RX ORDER — PANTOPRAZOLE SODIUM 40 MG/1
40 TABLET, DELAYED RELEASE ORAL
Qty: 90 TABLET | Refills: 0 | Status: SHIPPED | OUTPATIENT
Start: 2024-04-24

## 2024-04-24 RX ORDER — AMLODIPINE BESYLATE 5 MG/1
5 TABLET ORAL DAILY
Qty: 90 TABLET | Refills: 1 | Status: SHIPPED | OUTPATIENT
Start: 2024-04-24

## 2024-04-24 NOTE — PROGRESS NOTES
Boyd Howe, 63 y.o.,  male    SUBJECTIVE  Ff-up    HTN- reviewed BP log 120's, taking norvasc without problems. Only took metoprolol once past month. Has improved anxiety as well, less work stress, transferred locations.     HL- reviewed cv risk factors, very much opposed to statins    GERD- says more symptomatic with spicy food he is trying to avoid, takes tums almost daily, worse at night, no dysphagia, weight loss melena    ROS:  See HPI, all others negative        There is no problem list on file for this patient.      Current Outpatient Medications   Medication Sig Dispense Refill    amLODIPine (NORVASC) 5 MG tablet Take 1 tablet by mouth daily 90 tablet 1    pantoprazole (PROTONIX) 40 MG tablet Take 1 tablet by mouth every morning (before breakfast) 90 tablet 0    metoprolol tartrate (LOPRESSOR) 50 MG tablet Take 1 tablet by mouth 2 times daily 60 tablet 0     No current facility-administered medications for this visit.       Allergies   Allergen Reactions    Penicillins Hives, Itching and Rash       Past Medical History:   Diagnosis Date    H/O colonoscopy 01/05/2016    rpt 5 years, 2021       Social History     Socioeconomic History    Marital status:      Spouse name: Not on file    Number of children: Not on file    Years of education: Not on file    Highest education level: Not on file   Occupational History    Not on file   Tobacco Use    Smoking status: Never    Smokeless tobacco: Never   Substance and Sexual Activity    Alcohol use: No    Drug use: No    Sexual activity: Not on file   Other Topics Concern    Not on file   Social History Narrative    Not on file     Social Determinants of Health     Financial Resource Strain: Low Risk  (4/23/2024)    Overall Financial Resource Strain (CARDIA)     Difficulty of Paying Living Expenses: Not hard at all   Food Insecurity: No Food Insecurity (4/23/2024)    Hunger Vital Sign     Worried About Running Out of Food in the Last Year: Never true

## 2024-04-24 NOTE — PROGRESS NOTES
\"Have you been to the ER, urgent care clinic since your last visit?  Hospitalized since your last visit?\"    NO    “Have you seen or consulted any other health care providers outside of [facility] since your last visit?”    NO          Click Here for Release of Records Request

## 2024-08-07 NOTE — TELEPHONE ENCOUNTER
Pt calling about RX refill Pantoprazole requested on 7/31/2024. Pt has an appt scheduled for 8/20/2024. Please advise.

## 2024-08-08 RX ORDER — PANTOPRAZOLE SODIUM 40 MG/1
40 TABLET, DELAYED RELEASE ORAL
Qty: 90 TABLET | Refills: 0 | OUTPATIENT
Start: 2024-08-08

## 2024-08-08 RX ORDER — PANTOPRAZOLE SODIUM 40 MG/1
40 TABLET, DELAYED RELEASE ORAL
Qty: 90 TABLET | Refills: 0 | Status: SHIPPED | OUTPATIENT
Start: 2024-08-08

## 2024-08-16 ENCOUNTER — HOSPITAL ENCOUNTER (OUTPATIENT)
Facility: HOSPITAL | Age: 64
Discharge: HOME | End: 2024-08-16
Payer: COMMERCIAL

## 2024-08-16 DIAGNOSIS — Z12.5 SCREENING PSA (PROSTATE SPECIFIC ANTIGEN): ICD-10-CM

## 2024-08-16 DIAGNOSIS — I10 PRIMARY HYPERTENSION: ICD-10-CM

## 2024-08-16 LAB
ALBUMIN SERPL-MCNC: 3.8 G/DL (ref 3.4–5)
ALBUMIN/GLOB SERPL: 1.3 (ref 0.8–1.7)
ALP SERPL-CCNC: 59 U/L (ref 45–117)
ALT SERPL-CCNC: 27 U/L (ref 16–61)
ANION GAP SERPL CALC-SCNC: 4 MMOL/L (ref 3–18)
AST SERPL-CCNC: 17 U/L (ref 10–38)
BASOPHILS # BLD: 0.1 K/UL (ref 0–0.1)
BASOPHILS NFR BLD: 1 % (ref 0–2)
BILIRUB SERPL-MCNC: 0.3 MG/DL (ref 0.2–1)
BUN SERPL-MCNC: 18 MG/DL (ref 7–18)
BUN/CREAT SERPL: 15 (ref 12–20)
CALCIUM SERPL-MCNC: 8.8 MG/DL (ref 8.5–10.1)
CHLORIDE SERPL-SCNC: 108 MMOL/L (ref 100–111)
CHOLEST SERPL-MCNC: 168 MG/DL
CO2 SERPL-SCNC: 27 MMOL/L (ref 21–32)
CREAT SERPL-MCNC: 1.19 MG/DL (ref 0.6–1.3)
DIFFERENTIAL METHOD BLD: ABNORMAL
EOSINOPHIL # BLD: 0.1 K/UL (ref 0–0.4)
EOSINOPHIL NFR BLD: 2 % (ref 0–5)
ERYTHROCYTE [DISTWIDTH] IN BLOOD BY AUTOMATED COUNT: 12.9 % (ref 11.6–14.5)
GLOBULIN SER CALC-MCNC: 3 G/DL (ref 2–4)
GLUCOSE SERPL-MCNC: 96 MG/DL (ref 74–99)
HCT VFR BLD AUTO: 51.7 % (ref 36–48)
HDLC SERPL-MCNC: 46 MG/DL (ref 40–60)
HDLC SERPL: 3.7 (ref 0–5)
HGB BLD-MCNC: 16.6 G/DL (ref 13–16)
IMM GRANULOCYTES # BLD AUTO: 0 K/UL (ref 0–0.04)
IMM GRANULOCYTES NFR BLD AUTO: 0 % (ref 0–0.5)
LDLC SERPL CALC-MCNC: 108.8 MG/DL (ref 0–100)
LIPID PANEL: ABNORMAL
LYMPHOCYTES # BLD: 0.9 K/UL (ref 0.9–3.6)
LYMPHOCYTES NFR BLD: 18 % (ref 21–52)
MCH RBC QN AUTO: 28.9 PG (ref 24–34)
MCHC RBC AUTO-ENTMCNC: 32.1 G/DL (ref 31–37)
MCV RBC AUTO: 89.9 FL (ref 78–100)
MONOCYTES # BLD: 0.5 K/UL (ref 0.05–1.2)
MONOCYTES NFR BLD: 10 % (ref 3–10)
NEUTS SEG # BLD: 3.5 K/UL (ref 1.8–8)
NEUTS SEG NFR BLD: 69 % (ref 40–73)
NRBC # BLD: 0 K/UL (ref 0–0.01)
NRBC BLD-RTO: 0 PER 100 WBC
PLATELET # BLD AUTO: 183 K/UL (ref 135–420)
PMV BLD AUTO: 10.4 FL (ref 9.2–11.8)
POTASSIUM SERPL-SCNC: 5.3 MMOL/L (ref 3.5–5.5)
PROT SERPL-MCNC: 6.8 G/DL (ref 6.4–8.2)
PSA SERPL-MCNC: 0.5 NG/ML (ref 0–4)
RBC # BLD AUTO: 5.75 M/UL (ref 4.35–5.65)
SODIUM SERPL-SCNC: 139 MMOL/L (ref 136–145)
TRIGL SERPL-MCNC: 66 MG/DL
VLDLC SERPL CALC-MCNC: 13.2 MG/DL
WBC # BLD AUTO: 5 K/UL (ref 4.6–13.2)

## 2024-08-16 PROCEDURE — 36415 COLL VENOUS BLD VENIPUNCTURE: CPT

## 2024-08-16 PROCEDURE — G0103 PSA SCREENING: HCPCS

## 2024-08-16 PROCEDURE — 80053 COMPREHEN METABOLIC PANEL: CPT

## 2024-08-16 PROCEDURE — 85025 COMPLETE CBC W/AUTO DIFF WBC: CPT

## 2024-08-16 PROCEDURE — 80061 LIPID PANEL: CPT

## 2024-08-20 ENCOUNTER — OFFICE VISIT (OUTPATIENT)
Facility: CLINIC | Age: 64
End: 2024-08-20
Payer: COMMERCIAL

## 2024-08-20 VITALS
HEART RATE: 65 BPM | SYSTOLIC BLOOD PRESSURE: 110 MMHG | WEIGHT: 143 LBS | DIASTOLIC BLOOD PRESSURE: 70 MMHG | HEIGHT: 66 IN | OXYGEN SATURATION: 96 % | BODY MASS INDEX: 22.98 KG/M2 | TEMPERATURE: 98 F | RESPIRATION RATE: 16 BRPM

## 2024-08-20 DIAGNOSIS — R73.01 IFG (IMPAIRED FASTING GLUCOSE): ICD-10-CM

## 2024-08-20 DIAGNOSIS — K21.9 GASTROESOPHAGEAL REFLUX DISEASE, UNSPECIFIED WHETHER ESOPHAGITIS PRESENT: Primary | ICD-10-CM

## 2024-08-20 DIAGNOSIS — D58.2 ELEVATED HEMOGLOBIN (HCC): ICD-10-CM

## 2024-08-20 DIAGNOSIS — E78.9 BORDERLINE HIGH CHOLESTEROL: ICD-10-CM

## 2024-08-20 DIAGNOSIS — I10 PRIMARY HYPERTENSION: ICD-10-CM

## 2024-08-20 PROCEDURE — 99214 OFFICE O/P EST MOD 30 MIN: CPT | Performed by: FAMILY MEDICINE

## 2024-08-20 PROCEDURE — 3078F DIAST BP <80 MM HG: CPT | Performed by: FAMILY MEDICINE

## 2024-08-20 PROCEDURE — 3074F SYST BP LT 130 MM HG: CPT | Performed by: FAMILY MEDICINE

## 2024-08-20 NOTE — PROGRESS NOTES
\"Have you been to the ER, urgent care clinic since your last visit?  Hospitalized since your last visit?\"    NO    “Have you seen or consulted any other health care providers outside of [facility] since your last visit?”    NO          Click Here for Release of Records Request

## 2024-08-20 NOTE — PROGRESS NOTES
Boyd Howe, 63 y.o.,  male    SUBJECTIVE  Ff-up    HTN-has stopped Norvasc several weeks ago, reports normotensive home readings has improved stress level with better work environment    HL- reviewed cv risk factors, very much opposed to statins.  Reviewed labs   IFG-this has also improved    GERD- says more symptomatic with spicy food he is trying to avoid, takes tums almost daily, worse at night, no dysphagia, weight loss melena    ROS:  See HPI, all others negative        There is no problem list on file for this patient.      Current Outpatient Medications   Medication Sig Dispense Refill    pantoprazole (PROTONIX) 40 MG tablet Take 1 tablet by mouth every morning (before breakfast) 90 tablet 0     No current facility-administered medications for this visit.       Allergies   Allergen Reactions    Penicillins Hives, Itching and Rash       Past Medical History:   Diagnosis Date    H/O colonoscopy 01/05/2016    rpt 5 years, 2021       Social History     Socioeconomic History    Marital status:      Spouse name: Not on file    Number of children: Not on file    Years of education: Not on file    Highest education level: Not on file   Occupational History    Not on file   Tobacco Use    Smoking status: Never    Smokeless tobacco: Never   Substance and Sexual Activity    Alcohol use: No    Drug use: No    Sexual activity: Not on file   Other Topics Concern    Not on file   Social History Narrative    Not on file     Social Determinants of Health     Financial Resource Strain: Low Risk  (4/23/2024)    Overall Financial Resource Strain (CARDIA)     Difficulty of Paying Living Expenses: Not hard at all   Food Insecurity: No Food Insecurity (4/23/2024)    Hunger Vital Sign     Worried About Running Out of Food in the Last Year: Never true     Ran Out of Food in the Last Year: Never true   Transportation Needs: Unknown (4/23/2024)    PRAPARE - Transportation     Lack of Transportation (Medical): Not

## 2024-10-31 RX ORDER — METOPROLOL TARTRATE 50 MG
50 TABLET ORAL 2 TIMES DAILY
Qty: 60 TABLET | Refills: 0 | OUTPATIENT
Start: 2024-10-31

## 2024-10-31 RX ORDER — PANTOPRAZOLE SODIUM 40 MG/1
40 TABLET, DELAYED RELEASE ORAL
Qty: 90 TABLET | Refills: 3 | Status: SHIPPED | OUTPATIENT
Start: 2024-10-31

## 2025-03-07 ENCOUNTER — HOSPITAL ENCOUNTER (OUTPATIENT)
Facility: HOSPITAL | Age: 65
Discharge: HOME | End: 2025-03-10
Payer: COMMERCIAL

## 2025-03-07 DIAGNOSIS — K21.9 GASTROESOPHAGEAL REFLUX DISEASE, UNSPECIFIED WHETHER ESOPHAGITIS PRESENT: ICD-10-CM

## 2025-03-07 DIAGNOSIS — R73.01 IFG (IMPAIRED FASTING GLUCOSE): ICD-10-CM

## 2025-03-07 LAB
ANION GAP SERPL CALC-SCNC: 5 MMOL/L (ref 3–18)
BASOPHILS # BLD: 0.05 K/UL (ref 0–0.1)
BASOPHILS NFR BLD: 1 % (ref 0–2)
BUN SERPL-MCNC: 17 MG/DL (ref 7–18)
BUN/CREAT SERPL: 15 (ref 12–20)
CALCIUM SERPL-MCNC: 8.4 MG/DL (ref 8.5–10.1)
CHLORIDE SERPL-SCNC: 107 MMOL/L (ref 100–111)
CO2 SERPL-SCNC: 29 MMOL/L (ref 21–32)
CREAT SERPL-MCNC: 1.17 MG/DL (ref 0.6–1.3)
DIFFERENTIAL METHOD BLD: ABNORMAL
EOSINOPHIL # BLD: 0.13 K/UL (ref 0–0.4)
EOSINOPHIL NFR BLD: 2.7 % (ref 0–5)
ERYTHROCYTE [DISTWIDTH] IN BLOOD BY AUTOMATED COUNT: 13.1 % (ref 11.6–14.5)
GLUCOSE SERPL-MCNC: 60 MG/DL (ref 74–99)
HCT VFR BLD AUTO: 46.9 % (ref 36–48)
HGB BLD-MCNC: 15.3 G/DL (ref 13–16)
IMM GRANULOCYTES # BLD AUTO: 0 K/UL (ref 0–0.04)
IMM GRANULOCYTES NFR BLD AUTO: 0 % (ref 0–0.5)
LYMPHOCYTES # BLD: 1.14 K/UL (ref 0.9–3.6)
LYMPHOCYTES NFR BLD: 23.4 % (ref 21–52)
MCH RBC QN AUTO: 28.4 PG (ref 24–34)
MCHC RBC AUTO-ENTMCNC: 32.6 G/DL (ref 31–37)
MCV RBC AUTO: 87 FL (ref 78–100)
MONOCYTES # BLD: 0.56 K/UL (ref 0.05–1.2)
MONOCYTES NFR BLD: 11.5 % (ref 3–10)
NEUTS SEG # BLD: 3 K/UL (ref 1.8–8)
NEUTS SEG NFR BLD: 61.4 % (ref 40–73)
NRBC # BLD: 0 K/UL (ref 0–0.01)
NRBC BLD-RTO: 0 PER 100 WBC
PLATELET # BLD AUTO: 242 K/UL (ref 135–420)
PMV BLD AUTO: 10.4 FL (ref 9.2–11.8)
POTASSIUM SERPL-SCNC: 4.4 MMOL/L (ref 3.5–5.5)
RBC # BLD AUTO: 5.39 M/UL (ref 4.35–5.65)
SODIUM SERPL-SCNC: 141 MMOL/L (ref 136–145)
WBC # BLD AUTO: 4.9 K/UL (ref 4.6–13.2)

## 2025-03-07 PROCEDURE — 36415 COLL VENOUS BLD VENIPUNCTURE: CPT

## 2025-03-07 PROCEDURE — 80048 BASIC METABOLIC PNL TOTAL CA: CPT

## 2025-03-07 PROCEDURE — 85025 COMPLETE CBC W/AUTO DIFF WBC: CPT

## 2025-03-10 SDOH — ECONOMIC STABILITY: FOOD INSECURITY: WITHIN THE PAST 12 MONTHS, THE FOOD YOU BOUGHT JUST DIDN'T LAST AND YOU DIDN'T HAVE MONEY TO GET MORE.: NEVER TRUE

## 2025-03-10 SDOH — ECONOMIC STABILITY: FOOD INSECURITY: WITHIN THE PAST 12 MONTHS, YOU WORRIED THAT YOUR FOOD WOULD RUN OUT BEFORE YOU GOT MONEY TO BUY MORE.: NEVER TRUE

## 2025-03-10 SDOH — ECONOMIC STABILITY: INCOME INSECURITY: IN THE LAST 12 MONTHS, WAS THERE A TIME WHEN YOU WERE NOT ABLE TO PAY THE MORTGAGE OR RENT ON TIME?: NO

## 2025-03-10 SDOH — ECONOMIC STABILITY: TRANSPORTATION INSECURITY
IN THE PAST 12 MONTHS, HAS THE LACK OF TRANSPORTATION KEPT YOU FROM MEDICAL APPOINTMENTS OR FROM GETTING MEDICATIONS?: NO

## 2025-03-11 ENCOUNTER — RESULTS FOLLOW-UP (OUTPATIENT)
Facility: HOSPITAL | Age: 65
End: 2025-03-11

## 2025-03-13 ENCOUNTER — OFFICE VISIT (OUTPATIENT)
Facility: CLINIC | Age: 65
End: 2025-03-13
Payer: COMMERCIAL

## 2025-03-13 VITALS
HEIGHT: 66 IN | HEART RATE: 80 BPM | RESPIRATION RATE: 16 BRPM | OXYGEN SATURATION: 97 % | BODY MASS INDEX: 23.78 KG/M2 | SYSTOLIC BLOOD PRESSURE: 138 MMHG | WEIGHT: 148 LBS | TEMPERATURE: 98.7 F | DIASTOLIC BLOOD PRESSURE: 72 MMHG

## 2025-03-13 DIAGNOSIS — E78.9 BORDERLINE HIGH CHOLESTEROL: ICD-10-CM

## 2025-03-13 DIAGNOSIS — Z12.5 PROSTATE CANCER SCREENING: ICD-10-CM

## 2025-03-13 DIAGNOSIS — R73.01 IFG (IMPAIRED FASTING GLUCOSE): ICD-10-CM

## 2025-03-13 DIAGNOSIS — I10 PRIMARY HYPERTENSION: ICD-10-CM

## 2025-03-13 DIAGNOSIS — K21.9 GASTROESOPHAGEAL REFLUX DISEASE, UNSPECIFIED WHETHER ESOPHAGITIS PRESENT: Primary | ICD-10-CM

## 2025-03-13 PROCEDURE — 3078F DIAST BP <80 MM HG: CPT | Performed by: FAMILY MEDICINE

## 2025-03-13 PROCEDURE — 99214 OFFICE O/P EST MOD 30 MIN: CPT | Performed by: FAMILY MEDICINE

## 2025-03-13 PROCEDURE — 3075F SYST BP GE 130 - 139MM HG: CPT | Performed by: FAMILY MEDICINE

## 2025-03-13 ASSESSMENT — PATIENT HEALTH QUESTIONNAIRE - PHQ9
SUM OF ALL RESPONSES TO PHQ QUESTIONS 1-9: 0
2. FEELING DOWN, DEPRESSED OR HOPELESS: NOT AT ALL
1. LITTLE INTEREST OR PLEASURE IN DOING THINGS: NOT AT ALL
SUM OF ALL RESPONSES TO PHQ QUESTIONS 1-9: 0

## 2025-03-13 NOTE — PROGRESS NOTES
Lack of Transportation (Non-Medical): No   Physical Activity: Sufficiently Active (3/14/2023)    Exercise Vital Sign     Days of Exercise per Week: 3 days     Minutes of Exercise per Session: 60 min   Stress: Not on file   Social Connections: Not on file   Intimate Partner Violence: Not on file   Housing Stability: Low Risk  (3/10/2025)    Housing Stability Vital Sign     Unable to Pay for Housing in the Last Year: No     Number of Times Moved in the Last Year: 0     Homeless in the Last Year: No       Family History   Problem Relation Age of Onset    Heart Failure Paternal Grandmother     Heart Attack Paternal Grandmother     Diabetes Father     High Cholesterol Father     Hypertension Mother     Other Mother          from colitis complications          OBJECTIVE    Physical Exam:     /72   Pulse 80   Temp 98.7 °F (37.1 °C) (Temporal)   Resp 16   Ht 1.676 m (5' 6\")   Wt 67.1 kg (148 lb)   SpO2 97%   BMI 23.89 kg/m²     General: alert, well-appearing, in no apparent distress or pain  Neck: supple, no adenopathy palpated  CVS: normal rate, regular rhythm, distinct S1 and S2  Lungs:clear to ausculation bilaterally, no crackles, wheezing or rhonchi noted  Abdomen: normoactive bowel sounds, soft, non-tender  Extremities: no edema, no cyanosis, MSK grossly normal  Skin: warm, no lesions, rashes noted  Psych:  mood and affect normal    CMP:   Lab Results   Component Value Date/Time     2025 03:25 PM    K 4.4 2025 03:25 PM     2025 03:25 PM    CO2 29 2025 03:25 PM    BUN 17 2025 03:25 PM    CREATININE 1.17 2025 03:25 PM    GLUCOSE 60 2025 03:25 PM    CALCIUM 8.4 2025 03:25 PM    BILITOT 0.3 2024 07:51 AM    AST 17 2024 07:51 AM    ALT 27 2024 07:51 AM        CBC:   Lab Results   Component Value Date/Time    WBC 4.9 2025 03:25 PM    RBC 5.39 2025 03:25 PM    HGB 15.3 2025 03:25 PM    HCT 46.9 2025 03:25 PM